# Patient Record
Sex: FEMALE | Race: WHITE | NOT HISPANIC OR LATINO | URBAN - METROPOLITAN AREA
[De-identification: names, ages, dates, MRNs, and addresses within clinical notes are randomized per-mention and may not be internally consistent; named-entity substitution may affect disease eponyms.]

---

## 2018-05-05 ENCOUNTER — APPOINTMENT (OUTPATIENT)
Dept: RADIOLOGY | Facility: MEDICAL CENTER | Age: 18
DRG: 329 | End: 2018-05-05
Payer: COMMERCIAL

## 2018-05-05 ENCOUNTER — HOSPITAL ENCOUNTER (INPATIENT)
Facility: MEDICAL CENTER | Age: 18
LOS: 11 days | DRG: 329 | End: 2018-05-16
Attending: EMERGENCY MEDICINE | Admitting: SURGERY
Payer: COMMERCIAL

## 2018-05-05 ENCOUNTER — RESOLUTE PROFESSIONAL BILLING HOSPITAL PROF FEE (OUTPATIENT)
Dept: HOSPITALIST | Facility: MEDICAL CENTER | Age: 18
End: 2018-05-05
Payer: COMMERCIAL

## 2018-05-05 DIAGNOSIS — S36.500A: ICD-10-CM

## 2018-05-05 DIAGNOSIS — S36.039A LACERATION OF SPLEEN, INITIAL ENCOUNTER: ICD-10-CM

## 2018-05-05 DIAGNOSIS — D62 ANEMIA DUE TO ACUTE BLOOD LOSS: ICD-10-CM

## 2018-05-05 DIAGNOSIS — S36.439A: ICD-10-CM

## 2018-05-05 DIAGNOSIS — J95.821 POSTOPERATIVE RESPIRATORY FAILURE (HCC): ICD-10-CM

## 2018-05-05 PROBLEM — Z53.09 CONTRAINDICATION TO DEEP VEIN THROMBOSIS (DVT) PROPHYLAXIS: Status: ACTIVE | Noted: 2018-05-05

## 2018-05-05 PROBLEM — T14.90XA TRAUMA: Status: ACTIVE | Noted: 2018-05-05

## 2018-05-05 LAB
ABO GROUP BLD: NORMAL
ABO GROUP BLD: NORMAL
ACTION RANGE TRIGGERED IACRT: NO
ALBUMIN SERPL BCP-MCNC: 3.9 G/DL (ref 3.2–4.9)
ALBUMIN/GLOB SERPL: 1.2 G/DL
ALP SERPL-CCNC: 71 U/L (ref 30–99)
ALT SERPL-CCNC: 38 U/L (ref 2–50)
ANION GAP SERPL CALC-SCNC: 10 MMOL/L (ref 0–11.9)
APTT PPP: 25.8 SEC (ref 24.7–36)
AST SERPL-CCNC: 60 U/L (ref 12–45)
BASE EXCESS BLDA CALC-SCNC: -5 MMOL/L (ref -4–3)
BILIRUB SERPL-MCNC: 0.3 MG/DL (ref 0.1–1.5)
BLD GP AB SCN SERPL QL: NORMAL
BODY TEMPERATURE: ABNORMAL DEGREES
BUN SERPL-MCNC: 13 MG/DL (ref 8–22)
CALCIUM SERPL-MCNC: 9.2 MG/DL (ref 8.5–10.5)
CHLORIDE SERPL-SCNC: 107 MMOL/L (ref 96–112)
CO2 BLDA-SCNC: 22 MMOL/L (ref 20–33)
CO2 SERPL-SCNC: 21 MMOL/L (ref 20–33)
CREAT SERPL-MCNC: 0.52 MG/DL (ref 0.5–1.4)
ERYTHROCYTE [DISTWIDTH] IN BLOOD BY AUTOMATED COUNT: 37.7 FL (ref 35.9–50)
ETHANOL BLD-MCNC: 0 G/DL
GLOBULIN SER CALC-MCNC: 3.2 G/DL (ref 1.9–3.5)
GLUCOSE SERPL-MCNC: 166 MG/DL (ref 65–99)
HCG SERPL QL: NEGATIVE
HCO3 BLDA-SCNC: 20.7 MMOL/L (ref 17–25)
HCT VFR BLD AUTO: 32.2 % (ref 37–47)
HGB BLD-MCNC: 7.9 G/DL (ref 12–16)
HGB BLD-MCNC: 9.9 G/DL (ref 12–16)
INR PPP: 0.97 (ref 0.87–1.13)
INST. QUALIFIED PATIENT IIQPT: YES
MCH RBC QN AUTO: 22.1 PG (ref 27–33)
MCHC RBC AUTO-ENTMCNC: 30.7 G/DL (ref 33.6–35)
MCV RBC AUTO: 72 FL (ref 81.4–97.8)
O2/TOTAL GAS SETTING VFR VENT: 40 %
PCO2 BLDA: 39.8 MMHG (ref 26–37)
PCO2 TEMP ADJ BLDA: 37.6 MMHG (ref 26–37)
PH BLDA: 7.33 [PH] (ref 7.4–7.5)
PH TEMP ADJ BLDA: 7.34 [PH] (ref 7.4–7.5)
PLATELET # BLD AUTO: 515 K/UL (ref 164–446)
PMV BLD AUTO: 10.1 FL (ref 9–12.9)
PO2 BLDA: 168 MMHG (ref 64–87)
PO2 TEMP ADJ BLDA: 161 MMHG (ref 64–87)
POTASSIUM SERPL-SCNC: 3.4 MMOL/L (ref 3.6–5.5)
PROT SERPL-MCNC: 7.1 G/DL (ref 6–8.2)
PROTHROMBIN TIME: 12.6 SEC (ref 12–14.6)
RBC # BLD AUTO: 4.47 M/UL (ref 4.2–5.4)
RH BLD: NORMAL
RH BLD: NORMAL
SAO2 % BLDA: 99 % (ref 93–99)
SODIUM SERPL-SCNC: 138 MMOL/L (ref 135–145)
SPECIMEN DRAWN FROM PATIENT: ABNORMAL
WBC # BLD AUTO: 26.1 K/UL (ref 4.8–10.8)

## 2018-05-05 PROCEDURE — 160009 HCHG ANES TIME/MIN: Performed by: SURGERY

## 2018-05-05 PROCEDURE — 88307 TISSUE EXAM BY PATHOLOGIST: CPT

## 2018-05-05 PROCEDURE — 110454 HCHG SHELL REV 250: Performed by: SURGERY

## 2018-05-05 PROCEDURE — 700111 HCHG RX REV CODE 636 W/ 250 OVERRIDE (IP)

## 2018-05-05 PROCEDURE — 85610 PROTHROMBIN TIME: CPT

## 2018-05-05 PROCEDURE — 72125 CT NECK SPINE W/O DYE: CPT

## 2018-05-05 PROCEDURE — 770022 HCHG ROOM/CARE - ICU (200)

## 2018-05-05 PROCEDURE — 94002 VENT MGMT INPAT INIT DAY: CPT

## 2018-05-05 PROCEDURE — 72170 X-RAY EXAM OF PELVIS: CPT

## 2018-05-05 PROCEDURE — 85730 THROMBOPLASTIN TIME PARTIAL: CPT

## 2018-05-05 PROCEDURE — 160048 HCHG OR STATISTICAL LEVEL 1-5: Performed by: SURGERY

## 2018-05-05 PROCEDURE — 160041 HCHG SURGERY MINUTES - EA ADDL 1 MIN LEVEL 4: Performed by: SURGERY

## 2018-05-05 PROCEDURE — 73070 X-RAY EXAM OF ELBOW: CPT | Mod: LT

## 2018-05-05 PROCEDURE — 71045 X-RAY EXAM CHEST 1 VIEW: CPT

## 2018-05-05 PROCEDURE — 700117 HCHG RX CONTRAST REV CODE 255: Performed by: EMERGENCY MEDICINE

## 2018-05-05 PROCEDURE — 85027 COMPLETE CBC AUTOMATED: CPT

## 2018-05-05 PROCEDURE — 500122 HCHG BOVIE, BLADE: Performed by: SURGERY

## 2018-05-05 PROCEDURE — 700105 HCHG RX REV CODE 258: Performed by: EMERGENCY MEDICINE

## 2018-05-05 PROCEDURE — 37799 UNLISTED PX VASCULAR SURGERY: CPT

## 2018-05-05 PROCEDURE — 70450 CT HEAD/BRAIN W/O DYE: CPT

## 2018-05-05 PROCEDURE — 700101 HCHG RX REV CODE 250

## 2018-05-05 PROCEDURE — 700105 HCHG RX REV CODE 258: Performed by: SURGERY

## 2018-05-05 PROCEDURE — 502240 HCHG MISC OR SUPPLY RC 0272: Performed by: SURGERY

## 2018-05-05 PROCEDURE — 71260 CT THORAX DX C+: CPT

## 2018-05-05 PROCEDURE — 86850 RBC ANTIBODY SCREEN: CPT

## 2018-05-05 PROCEDURE — 73560 X-RAY EXAM OF KNEE 1 OR 2: CPT | Mod: RT

## 2018-05-05 PROCEDURE — 700111 HCHG RX REV CODE 636 W/ 250 OVERRIDE (IP): Performed by: NURSE PRACTITIONER

## 2018-05-05 PROCEDURE — 80053 COMPREHEN METABOLIC PANEL: CPT

## 2018-05-05 PROCEDURE — 72131 CT LUMBAR SPINE W/O DYE: CPT

## 2018-05-05 PROCEDURE — 160029 HCHG SURGERY MINUTES - 1ST 30 MINS LEVEL 4: Performed by: SURGERY

## 2018-05-05 PROCEDURE — 82947 ASSAY GLUCOSE BLOOD QUANT: CPT

## 2018-05-05 PROCEDURE — 80307 DRUG TEST PRSMV CHEM ANLYZR: CPT

## 2018-05-05 PROCEDURE — G0390 TRAUMA RESPONS W/HOSP CRITI: HCPCS

## 2018-05-05 PROCEDURE — 85014 HEMATOCRIT: CPT

## 2018-05-05 PROCEDURE — 82803 BLOOD GASES ANY COMBINATION: CPT

## 2018-05-05 PROCEDURE — 700101 HCHG RX REV CODE 250: Performed by: NURSE PRACTITIONER

## 2018-05-05 PROCEDURE — 84703 CHORIONIC GONADOTROPIN ASSAY: CPT

## 2018-05-05 PROCEDURE — 86901 BLOOD TYPING SEROLOGIC RH(D): CPT

## 2018-05-05 PROCEDURE — 84295 ASSAY OF SERUM SODIUM: CPT

## 2018-05-05 PROCEDURE — 501838 HCHG SUTURE GENERAL: Performed by: SURGERY

## 2018-05-05 PROCEDURE — 501445 HCHG STAPLER, SKIN DISP: Performed by: SURGERY

## 2018-05-05 PROCEDURE — 72128 CT CHEST SPINE W/O DYE: CPT

## 2018-05-05 PROCEDURE — 501461: Performed by: SURGERY

## 2018-05-05 PROCEDURE — 85018 HEMOGLOBIN: CPT

## 2018-05-05 PROCEDURE — 84132 ASSAY OF SERUM POTASSIUM: CPT

## 2018-05-05 PROCEDURE — 86900 BLOOD TYPING SEROLOGIC ABO: CPT

## 2018-05-05 PROCEDURE — 99291 CRITICAL CARE FIRST HOUR: CPT

## 2018-05-05 PROCEDURE — 82330 ASSAY OF CALCIUM: CPT

## 2018-05-05 PROCEDURE — 70498 CT ANGIOGRAPHY NECK: CPT

## 2018-05-05 RX ORDER — SODIUM CHLORIDE 9 MG/ML
INJECTION, SOLUTION INTRAVENOUS
Status: COMPLETED | OUTPATIENT
Start: 2018-05-05 | End: 2018-05-05

## 2018-05-05 RX ORDER — ENEMA 19; 7 G/133ML; G/133ML
1 ENEMA RECTAL
Status: DISCONTINUED | OUTPATIENT
Start: 2018-05-05 | End: 2018-05-16 | Stop reason: HOSPADM

## 2018-05-05 RX ORDER — ACETAMINOPHEN 650 MG/1
650 SUPPOSITORY RECTAL EVERY 4 HOURS PRN
Status: DISCONTINUED | OUTPATIENT
Start: 2018-05-05 | End: 2018-05-16 | Stop reason: HOSPADM

## 2018-05-05 RX ORDER — OXYCODONE HYDROCHLORIDE 5 MG/1
5 TABLET ORAL
Status: DISCONTINUED | OUTPATIENT
Start: 2018-05-05 | End: 2018-05-16 | Stop reason: HOSPADM

## 2018-05-05 RX ORDER — SODIUM CHLORIDE 9 MG/ML
INJECTION, SOLUTION INTRAVENOUS CONTINUOUS
Status: DISCONTINUED | OUTPATIENT
Start: 2018-05-05 | End: 2018-05-09

## 2018-05-05 RX ORDER — AMOXICILLIN 250 MG
1 CAPSULE ORAL NIGHTLY
Status: DISCONTINUED | OUTPATIENT
Start: 2018-05-05 | End: 2018-05-16 | Stop reason: HOSPADM

## 2018-05-05 RX ORDER — AMOXICILLIN 250 MG
1 CAPSULE ORAL
Status: DISCONTINUED | OUTPATIENT
Start: 2018-05-05 | End: 2018-05-16 | Stop reason: HOSPADM

## 2018-05-05 RX ORDER — MAGNESIUM HYDROXIDE 1200 MG/15ML
LIQUID ORAL
Status: COMPLETED | OUTPATIENT
Start: 2018-05-05 | End: 2018-05-05

## 2018-05-05 RX ORDER — POLYETHYLENE GLYCOL 3350 17 G/17G
1 POWDER, FOR SOLUTION ORAL 2 TIMES DAILY
Status: DISCONTINUED | OUTPATIENT
Start: 2018-05-05 | End: 2018-05-16 | Stop reason: HOSPADM

## 2018-05-05 RX ORDER — SILICONES/ADHESIVE TAPE
COMBINATION PACKAGE (EA) TOPICAL 3 TIMES DAILY
Status: DISCONTINUED | OUTPATIENT
Start: 2018-05-05 | End: 2018-05-16 | Stop reason: HOSPADM

## 2018-05-05 RX ORDER — OXYCODONE HYDROCHLORIDE 10 MG/1
10 TABLET ORAL
Status: DISCONTINUED | OUTPATIENT
Start: 2018-05-05 | End: 2018-05-16 | Stop reason: HOSPADM

## 2018-05-05 RX ORDER — FAMOTIDINE 20 MG/1
20 TABLET, FILM COATED ORAL 2 TIMES DAILY
Status: DISCONTINUED | OUTPATIENT
Start: 2018-05-05 | End: 2018-05-08

## 2018-05-05 RX ORDER — ACETAMINOPHEN 325 MG/1
650 TABLET ORAL EVERY 4 HOURS PRN
Status: DISCONTINUED | OUTPATIENT
Start: 2018-05-05 | End: 2018-05-16 | Stop reason: HOSPADM

## 2018-05-05 RX ORDER — MORPHINE SULFATE 10 MG/ML
1-4 INJECTION, SOLUTION INTRAMUSCULAR; INTRAVENOUS
Status: DISCONTINUED | OUTPATIENT
Start: 2018-05-05 | End: 2018-05-06

## 2018-05-05 RX ORDER — BISACODYL 10 MG
10 SUPPOSITORY, RECTAL RECTAL
Status: DISCONTINUED | OUTPATIENT
Start: 2018-05-05 | End: 2018-05-16 | Stop reason: HOSPADM

## 2018-05-05 RX ORDER — DOCUSATE SODIUM 100 MG/1
100 CAPSULE, LIQUID FILLED ORAL 2 TIMES DAILY
Status: DISCONTINUED | OUTPATIENT
Start: 2018-05-05 | End: 2018-05-16 | Stop reason: HOSPADM

## 2018-05-05 RX ADMIN — IOHEXOL 80 ML: 350 INJECTION, SOLUTION INTRAVENOUS at 18:45

## 2018-05-05 RX ADMIN — SODIUM CHLORIDE: 9 INJECTION, SOLUTION INTRAVENOUS at 23:00

## 2018-05-05 RX ADMIN — IOHEXOL 60 ML: 350 INJECTION, SOLUTION INTRAVENOUS at 18:48

## 2018-05-05 RX ADMIN — PROPOFOL 10 MCG/KG/MIN: 10 INJECTION, EMULSION INTRAVENOUS at 21:16

## 2018-05-05 RX ADMIN — FAMOTIDINE 20 MG: 10 INJECTION INTRAVENOUS at 21:16

## 2018-05-05 RX ADMIN — MORPHINE SULFATE 10 MG: 10 INJECTION INTRAVENOUS at 21:46

## 2018-05-05 RX ADMIN — SODIUM CHLORIDE 1000 ML: 9 INJECTION, SOLUTION INTRAVENOUS at 18:13

## 2018-05-06 ENCOUNTER — APPOINTMENT (OUTPATIENT)
Dept: RADIOLOGY | Facility: MEDICAL CENTER | Age: 18
DRG: 329 | End: 2018-05-06
Attending: NURSE PRACTITIONER
Payer: COMMERCIAL

## 2018-05-06 PROBLEM — S36.500A: Status: ACTIVE | Noted: 2018-05-06

## 2018-05-06 PROBLEM — S36.439A: Status: ACTIVE | Noted: 2018-05-06

## 2018-05-06 PROBLEM — J95.821 POSTOPERATIVE RESPIRATORY FAILURE (HCC): Status: ACTIVE | Noted: 2018-05-06

## 2018-05-06 LAB
ALBUMIN SERPL BCP-MCNC: 3 G/DL (ref 3.2–4.9)
ALBUMIN/GLOB SERPL: 1.3 G/DL
ALP SERPL-CCNC: 48 U/L (ref 30–99)
ALT SERPL-CCNC: 30 U/L (ref 2–50)
ANION GAP SERPL CALC-SCNC: 8 MMOL/L (ref 0–11.9)
AST SERPL-CCNC: 35 U/L (ref 12–45)
BARCODED ABORH UBTYP: 600
BARCODED PRD CODE UBPRD: NORMAL
BARCODED UNIT NUM UBUNT: NORMAL
BASOPHILS # BLD AUTO: 0.1 % (ref 0–1.8)
BASOPHILS # BLD: 0.01 K/UL (ref 0–0.12)
BILIRUB SERPL-MCNC: 0.3 MG/DL (ref 0.1–1.5)
BUN SERPL-MCNC: 11 MG/DL (ref 8–22)
CALCIUM SERPL-MCNC: 7.9 MG/DL (ref 8.5–10.5)
CHLORIDE SERPL-SCNC: 109 MMOL/L (ref 96–112)
CO2 SERPL-SCNC: 20 MMOL/L (ref 20–33)
COMPONENT R 8504R: NORMAL
CREAT SERPL-MCNC: 0.46 MG/DL (ref 0.5–1.4)
EOSINOPHIL # BLD AUTO: 0 K/UL (ref 0–0.51)
EOSINOPHIL NFR BLD: 0 % (ref 0–6.9)
ERYTHROCYTE [DISTWIDTH] IN BLOOD BY AUTOMATED COUNT: 38.6 FL (ref 35.9–50)
GLOBULIN SER CALC-MCNC: 2.4 G/DL (ref 1.9–3.5)
GLUCOSE SERPL-MCNC: 155 MG/DL (ref 65–99)
HCT VFR BLD AUTO: 25.1 % (ref 37–47)
HGB BLD-MCNC: 6.6 G/DL (ref 12–16)
HGB BLD-MCNC: 7.2 G/DL (ref 12–16)
HGB BLD-MCNC: 7.8 G/DL (ref 12–16)
HGB BLD-MCNC: 8 G/DL (ref 12–16)
IMM GRANULOCYTES # BLD AUTO: 0.08 K/UL (ref 0–0.11)
IMM GRANULOCYTES NFR BLD AUTO: 0.5 % (ref 0–0.9)
LYMPHOCYTES # BLD AUTO: 0.68 K/UL (ref 1–4.8)
LYMPHOCYTES NFR BLD: 4.5 % (ref 22–41)
MCH RBC QN AUTO: 22.3 PG (ref 27–33)
MCHC RBC AUTO-ENTMCNC: 31.1 G/DL (ref 33.6–35)
MCV RBC AUTO: 71.9 FL (ref 81.4–97.8)
MONOCYTES # BLD AUTO: 1.01 K/UL (ref 0–0.85)
MONOCYTES NFR BLD AUTO: 6.7 % (ref 0–13.4)
NEUTROPHILS # BLD AUTO: 13.31 K/UL (ref 2–7.15)
NEUTROPHILS NFR BLD: 88.2 % (ref 44–72)
NRBC # BLD AUTO: 0 K/UL
NRBC BLD-RTO: 0 /100 WBC
PLATELET # BLD AUTO: 350 K/UL (ref 164–446)
PMV BLD AUTO: 10.3 FL (ref 9–12.9)
POTASSIUM SERPL-SCNC: 4 MMOL/L (ref 3.6–5.5)
PRODUCT TYPE UPROD: NORMAL
PROT SERPL-MCNC: 5.4 G/DL (ref 6–8.2)
RBC # BLD AUTO: 3.49 M/UL (ref 4.2–5.4)
SODIUM SERPL-SCNC: 137 MMOL/L (ref 135–145)
UNIT STATUS USTAT: NORMAL
WBC # BLD AUTO: 15.1 K/UL (ref 4.8–10.8)

## 2018-05-06 PROCEDURE — 30243N1 TRANSFUSION OF NONAUTOLOGOUS RED BLOOD CELLS INTO CENTRAL VEIN, PERCUTANEOUS APPROACH: ICD-10-PCS | Performed by: SURGERY

## 2018-05-06 PROCEDURE — 85018 HEMOGLOBIN: CPT | Mod: 91

## 2018-05-06 PROCEDURE — 94003 VENT MGMT INPAT SUBQ DAY: CPT

## 2018-05-06 PROCEDURE — 07QP0ZZ REPAIR SPLEEN, OPEN APPROACH: ICD-10-PCS | Performed by: SURGERY

## 2018-05-06 PROCEDURE — 36430 TRANSFUSION BLD/BLD COMPNT: CPT

## 2018-05-06 PROCEDURE — 86923 COMPATIBILITY TEST ELECTRIC: CPT

## 2018-05-06 PROCEDURE — 700105 HCHG RX REV CODE 258: Performed by: SURGERY

## 2018-05-06 PROCEDURE — 700101 HCHG RX REV CODE 250: Performed by: NURSE PRACTITIONER

## 2018-05-06 PROCEDURE — 0DT80ZZ RESECTION OF SMALL INTESTINE, OPEN APPROACH: ICD-10-PCS | Performed by: SURGERY

## 2018-05-06 PROCEDURE — 85025 COMPLETE CBC W/AUTO DIFF WBC: CPT

## 2018-05-06 PROCEDURE — 770022 HCHG ROOM/CARE - ICU (200)

## 2018-05-06 PROCEDURE — 0DQV0ZZ REPAIR MESENTERY, OPEN APPROACH: ICD-10-PCS | Performed by: SURGERY

## 2018-05-06 PROCEDURE — 700101 HCHG RX REV CODE 250

## 2018-05-06 PROCEDURE — 0DQH0ZZ REPAIR CECUM, OPEN APPROACH: ICD-10-PCS | Performed by: SURGERY

## 2018-05-06 PROCEDURE — 0JB80ZZ EXCISION OF ABDOMEN SUBCUTANEOUS TISSUE AND FASCIA, OPEN APPROACH: ICD-10-PCS | Performed by: SURGERY

## 2018-05-06 PROCEDURE — 94150 VITAL CAPACITY TEST: CPT

## 2018-05-06 PROCEDURE — 94669 MECHANICAL CHEST WALL OSCILL: CPT

## 2018-05-06 PROCEDURE — 80053 COMPREHEN METABOLIC PANEL: CPT

## 2018-05-06 PROCEDURE — 94668 MNPJ CHEST WALL SBSQ: CPT

## 2018-05-06 PROCEDURE — P9016 RBC LEUKOCYTES REDUCED: HCPCS

## 2018-05-06 PROCEDURE — 700111 HCHG RX REV CODE 636 W/ 250 OVERRIDE (IP): Performed by: SURGERY

## 2018-05-06 PROCEDURE — 99291 CRITICAL CARE FIRST HOUR: CPT | Performed by: SURGERY

## 2018-05-06 PROCEDURE — 71045 X-RAY EXAM CHEST 1 VIEW: CPT

## 2018-05-06 PROCEDURE — 700111 HCHG RX REV CODE 636 W/ 250 OVERRIDE (IP): Performed by: NURSE PRACTITIONER

## 2018-05-06 PROCEDURE — 700102 HCHG RX REV CODE 250 W/ 637 OVERRIDE(OP)

## 2018-05-06 RX ORDER — MORPHINE SULFATE 4 MG/ML
1-4 INJECTION, SOLUTION INTRAMUSCULAR; INTRAVENOUS
Status: DISCONTINUED | OUTPATIENT
Start: 2018-05-06 | End: 2018-05-08

## 2018-05-06 RX ADMIN — PROPOFOL 40 MCG/KG/MIN: 10 INJECTION, EMULSION INTRAVENOUS at 05:03

## 2018-05-06 RX ADMIN — MORPHINE SULFATE 4 MG: 4 INJECTION INTRAVENOUS at 19:41

## 2018-05-06 RX ADMIN — MORPHINE SULFATE 4 MG: 4 INJECTION INTRAVENOUS at 22:03

## 2018-05-06 RX ADMIN — FAMOTIDINE 20 MG: 10 INJECTION INTRAVENOUS at 07:31

## 2018-05-06 RX ADMIN — MORPHINE SULFATE 4 MG: 4 INJECTION INTRAVENOUS at 13:49

## 2018-05-06 RX ADMIN — FAMOTIDINE 20 MG: 10 INJECTION INTRAVENOUS at 21:42

## 2018-05-06 RX ADMIN — MORPHINE SULFATE 4 MG: 10 INJECTION INTRAVENOUS at 11:04

## 2018-05-06 RX ADMIN — BACITRACIN ZINC, AND POLYMYXIN B SULFATE: 500; 10000 OINTMENT TOPICAL at 07:39

## 2018-05-06 RX ADMIN — BACITRACIN ZINC, AND POLYMYXIN B SULFATE: 500; 10000 OINTMENT TOPICAL at 13:50

## 2018-05-06 RX ADMIN — BACITRACIN ZINC, AND POLYMYXIN B SULFATE: 500; 10000 OINTMENT TOPICAL at 21:42

## 2018-05-06 RX ADMIN — SODIUM CHLORIDE: 9 INJECTION, SOLUTION INTRAVENOUS at 21:45

## 2018-05-06 RX ADMIN — MORPHINE SULFATE 4 MG: 10 INJECTION INTRAVENOUS at 07:31

## 2018-05-06 RX ADMIN — SODIUM CHLORIDE: 9 INJECTION, SOLUTION INTRAVENOUS at 07:31

## 2018-05-06 RX ADMIN — MORPHINE SULFATE 4 MG: 4 INJECTION INTRAVENOUS at 17:53

## 2018-05-06 RX ADMIN — SODIUM CHLORIDE: 9 INJECTION, SOLUTION INTRAVENOUS at 16:34

## 2018-05-06 ASSESSMENT — PAIN SCALES - GENERAL
PAINLEVEL_OUTOF10: 7
PAINLEVEL_OUTOF10: 3
PAINLEVEL_OUTOF10: 7
PAINLEVEL_OUTOF10: 6
PAINLEVEL_OUTOF10: 8
PAINLEVEL_OUTOF10: 3

## 2018-05-06 ASSESSMENT — PULMONARY FUNCTION TESTS: FVC: .89

## 2018-05-06 ASSESSMENT — COPD QUESTIONNAIRES
HAVE YOU SMOKED AT LEAST 100 CIGARETTES IN YOUR ENTIRE LIFE: NO/DON'T KNOW
DURING THE PAST 4 WEEKS HOW MUCH DID YOU FEEL SHORT OF BREATH: NONE/LITTLE OF THE TIME
DO YOU EVER COUGH UP ANY MUCUS OR PHLEGM?: NO/ONLY WITH OCCASIONAL COLDS OR INFECTIONS
COPD SCREENING SCORE: 0

## 2018-05-06 ASSESSMENT — LIFESTYLE VARIABLES
EVER_SMOKED: NEVER
EVER_SMOKED: NEVER

## 2018-05-06 NOTE — RESPIRATORY CARE
Extubation    Cuff leak noted yes  Stridor present no     FiO2%: 30 % (05/06/18 0800)  O2 (LPM): 4 (05/06/18 1007)  O2 Daily Delivery Respiratory : Silicone Nasal Cannula (05/06/18 1007)  Patient tolerated well  RCP Complete? Will reassess when able to speak  Events/Summary/Plan: pt. extubated to 4LNC, discussed during rounds with MD. (05/06/18 1007)

## 2018-05-06 NOTE — CARE PLAN
Problem: Ventilation Defect:  Goal: Ability to achieve and maintain unassisted ventilation or tolerate decreased levels of ventilator support    Intervention: Support and monitor invasive and noninvasive mechanical ventilation  Adult Ventilation Update    Total Vent Days: 2    Patient Lines/Drains/Airways Status    Active Airway     Name: Placement date: Placement time: Site: Days:    Airway Group ET Tube Oral 6.5 05/05/18      Oral   2              Sputum Amount: Scant (05/06/18 0400)  Sputum Color: Clear (05/06/18 0400)  Sputum Consistency: Thin (05/06/18 0400)    Mobility  Level of Mobility: Level II (05/05/18 2200)  Activity Performed: Unable to mobilize (05/05/18 2200)  Reason Not Mobilized: Unstable condition (05/05/18 2200)    Events/Summary/Plan: fio2 decreased to 30% post abg (05/05/18 2236)
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
## 2018-05-06 NOTE — CARE PLAN
Problem: Bowel/Gastric:  Goal: Will not experience complications related to bowel motility  Outcome: PROGRESSING AS EXPECTED  Patient strict NPO, no bowel movement since admission    Problem: Pain Management  Goal: Pain level will decrease to patient's comfort goal  Outcome: PROGRESSING AS EXPECTED  Appropriate PRN medications on MAR   05/05/18 2200 05/06/18 0222   OTHER   CPOT Total Score 7 --    Comfort Goal --  Comfort at Rest;Comfort with Movement
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
## 2018-05-06 NOTE — PROGRESS NOTES
RN to round with Dr. Renteria. MD orders that pt is to remain on bedrest, MD orders to keep the arterial line in place, MD orders that pts morphine may be changed to q 1 hours.
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
## 2018-05-06 NOTE — CARE PLAN
Problem: Venous Thromboembolism (VTW)/Deep Vein Thrombosis (DVT) Prevention:  Goal: Patient will participate in Venous Thrombosis (VTE)/Deep Vein Thrombosis (DVT)Prevention Measures    Intervention: Ensure patient wears graduated elastic stockings (SANDIP hose) and/or SCDs, if ordered, when in bed or chair (Remove at least once per shift for skin check)  SCDs are in place, SCD machine is on, SCDs are in place.       Problem: Pain Management  Goal: Pain level will decrease to patient's comfort goal    Intervention: Follow pain managment plan developed in collaboration with patient and Interdisciplinary Team  RN to assess pts pain at regular intervals, RN to provide pain medication prn. RN will continue to assess pts pain needs.
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
## 2018-05-07 LAB
ACTION RANGE TRIGGERED IACRT: NO
ANION GAP SERPL CALC-SCNC: 9 MMOL/L (ref 0–11.9)
BASE EXCESS BLDA CALC-SCNC: -3 MMOL/L (ref -4–3)
BASOPHILS # BLD AUTO: 0.4 % (ref 0–1.8)
BASOPHILS # BLD: 0.06 K/UL (ref 0–0.12)
BODY TEMPERATURE: ABNORMAL DEGREES
BUN SERPL-MCNC: 6 MG/DL (ref 8–22)
CA-I BLD ISE-SCNC: 1.14 MMOL/L (ref 1.1–1.3)
CALCIUM SERPL-MCNC: 7.9 MG/DL (ref 8.5–10.5)
CHLORIDE SERPL-SCNC: 108 MMOL/L (ref 96–112)
CO2 BLDA-SCNC: 23 MMOL/L (ref 20–33)
CO2 SERPL-SCNC: 21 MMOL/L (ref 20–33)
COMMENT 1642: NORMAL
CREAT SERPL-MCNC: 0.38 MG/DL (ref 0.5–1.4)
EOSINOPHIL # BLD AUTO: 0 K/UL (ref 0–0.51)
EOSINOPHIL NFR BLD: 0 % (ref 0–6.9)
ERYTHROCYTE [DISTWIDTH] IN BLOOD BY AUTOMATED COUNT: 39.7 FL (ref 35.9–50)
GLUCOSE BLD-MCNC: 120 MG/DL (ref 65–99)
GLUCOSE SERPL-MCNC: 112 MG/DL (ref 65–99)
HCO3 BLDA-SCNC: 22.2 MMOL/L (ref 17–25)
HCT VFR BLD AUTO: 25.4 % (ref 37–47)
HCT VFR BLD CALC: 24 % (ref 37–47)
HGB BLD-MCNC: 7.8 G/DL (ref 12–16)
HGB BLD-MCNC: 7.9 G/DL (ref 12–16)
HGB BLD-MCNC: 8.2 G/DL (ref 12–16)
HGB BLD-MCNC: 8.2 G/DL (ref 12–16)
HGB BLD-MCNC: 8.3 G/DL (ref 12–16)
IMM GRANULOCYTES # BLD AUTO: 0.06 K/UL (ref 0–0.11)
IMM GRANULOCYTES NFR BLD AUTO: 0.4 % (ref 0–0.9)
INST. QUALIFIED PATIENT IIQPT: YES
LYMPHOCYTES # BLD AUTO: 1.27 K/UL (ref 1–4.8)
LYMPHOCYTES NFR BLD: 8.7 % (ref 22–41)
MAGNESIUM SERPL-MCNC: 1.7 MG/DL (ref 1.5–2.5)
MCH RBC QN AUTO: 22.8 PG (ref 27–33)
MCHC RBC AUTO-ENTMCNC: 31.1 G/DL (ref 33.6–35)
MCV RBC AUTO: 73.2 FL (ref 81.4–97.8)
MONOCYTES # BLD AUTO: 0.57 K/UL (ref 0–0.85)
MONOCYTES NFR BLD AUTO: 3.9 % (ref 0–13.4)
MORPHOLOGY BLD-IMP: NORMAL
NEUTROPHILS # BLD AUTO: 12.57 K/UL (ref 2–7.15)
NEUTROPHILS NFR BLD: 86.6 % (ref 44–72)
NRBC # BLD AUTO: 0 K/UL
NRBC BLD-RTO: 0 /100 WBC
O2/TOTAL GAS SETTING VFR VENT: 95 %
PCO2 BLDA: 41.4 MMHG (ref 26–37)
PCO2 TEMP ADJ BLDA: 41.4 MMHG (ref 26–37)
PH BLDA: 7.34 [PH] (ref 7.4–7.5)
PH TEMP ADJ BLDA: 7.34 [PH] (ref 7.4–7.5)
PHOSPHATE SERPL-MCNC: 3 MG/DL (ref 2.5–4.5)
PLATELET # BLD AUTO: 227 K/UL (ref 164–446)
PLATELET BLD QL SMEAR: NORMAL
PMV BLD AUTO: 10.3 FL (ref 9–12.9)
PO2 BLDA: 422 MMHG (ref 64–87)
PO2 TEMP ADJ BLDA: 422 MMHG (ref 64–87)
POTASSIUM BLD-SCNC: 3.4 MMOL/L (ref 3.6–5.5)
POTASSIUM SERPL-SCNC: 3.7 MMOL/L (ref 3.6–5.5)
RBC # BLD AUTO: 3.47 M/UL (ref 4.2–5.4)
RBC BLD AUTO: NORMAL
SAO2 % BLDA: 100 % (ref 93–99)
SODIUM BLD-SCNC: 139 MMOL/L (ref 135–145)
SODIUM SERPL-SCNC: 138 MMOL/L (ref 135–145)
SPECIMEN DRAWN FROM PATIENT: ABNORMAL
TRIGL SERPL-MCNC: 85 MG/DL (ref 0–149)
WBC # BLD AUTO: 14.5 K/UL (ref 4.8–10.8)

## 2018-05-07 PROCEDURE — 84478 ASSAY OF TRIGLYCERIDES: CPT

## 2018-05-07 PROCEDURE — 501838 HCHG SUTURE GENERAL: Performed by: SURGERY

## 2018-05-07 PROCEDURE — 500697 HCHG HEMOCLIP, LARGE (ORANGE): Performed by: SURGERY

## 2018-05-07 PROCEDURE — 160041 HCHG SURGERY MINUTES - EA ADDL 1 MIN LEVEL 4: Performed by: SURGERY

## 2018-05-07 PROCEDURE — 700111 HCHG RX REV CODE 636 W/ 250 OVERRIDE (IP)

## 2018-05-07 PROCEDURE — 700101 HCHG RX REV CODE 250

## 2018-05-07 PROCEDURE — 84100 ASSAY OF PHOSPHORUS: CPT

## 2018-05-07 PROCEDURE — 0WJP0ZZ INSPECTION OF GASTROINTESTINAL TRACT, OPEN APPROACH: ICD-10-PCS | Performed by: SURGERY

## 2018-05-07 PROCEDURE — 700102 HCHG RX REV CODE 250 W/ 637 OVERRIDE(OP)

## 2018-05-07 PROCEDURE — 500698 HCHG HEMOCLIP, MEDIUM: Performed by: SURGERY

## 2018-05-07 PROCEDURE — 160029 HCHG SURGERY MINUTES - 1ST 30 MINS LEVEL 4: Performed by: SURGERY

## 2018-05-07 PROCEDURE — 85025 COMPLETE CBC W/AUTO DIFF WBC: CPT

## 2018-05-07 PROCEDURE — 94668 MNPJ CHEST WALL SBSQ: CPT

## 2018-05-07 PROCEDURE — 99233 SBSQ HOSP IP/OBS HIGH 50: CPT | Performed by: SURGERY

## 2018-05-07 PROCEDURE — 83735 ASSAY OF MAGNESIUM: CPT

## 2018-05-07 PROCEDURE — 85018 HEMOGLOBIN: CPT | Mod: 91

## 2018-05-07 PROCEDURE — 160002 HCHG RECOVERY MINUTES (STAT): Performed by: SURGERY

## 2018-05-07 PROCEDURE — 770022 HCHG ROOM/CARE - ICU (200)

## 2018-05-07 PROCEDURE — A9270 NON-COVERED ITEM OR SERVICE: HCPCS

## 2018-05-07 PROCEDURE — 160048 HCHG OR STATISTICAL LEVEL 1-5: Performed by: SURGERY

## 2018-05-07 PROCEDURE — 160035 HCHG PACU - 1ST 60 MINS PHASE I: Performed by: SURGERY

## 2018-05-07 PROCEDURE — 700111 HCHG RX REV CODE 636 W/ 250 OVERRIDE (IP): Performed by: SURGERY

## 2018-05-07 PROCEDURE — 94669 MECHANICAL CHEST WALL OSCILL: CPT

## 2018-05-07 PROCEDURE — 700111 HCHG RX REV CODE 636 W/ 250 OVERRIDE (IP): Performed by: NURSE PRACTITIONER

## 2018-05-07 PROCEDURE — 501445 HCHG STAPLER, SKIN DISP: Performed by: SURGERY

## 2018-05-07 PROCEDURE — 160009 HCHG ANES TIME/MIN: Performed by: SURGERY

## 2018-05-07 PROCEDURE — 80048 BASIC METABOLIC PNL TOTAL CA: CPT

## 2018-05-07 RX ORDER — MEPERIDINE HYDROCHLORIDE 25 MG/ML
INJECTION INTRAMUSCULAR; INTRAVENOUS; SUBCUTANEOUS
Status: COMPLETED
Start: 2018-05-07 | End: 2018-05-07

## 2018-05-07 RX ORDER — MIDAZOLAM HYDROCHLORIDE 1 MG/ML
INJECTION INTRAMUSCULAR; INTRAVENOUS
Status: DISPENSED
Start: 2018-05-07 | End: 2018-05-07

## 2018-05-07 RX ADMIN — BACITRACIN ZINC, AND POLYMYXIN B SULFATE: 500; 10000 OINTMENT TOPICAL at 15:00

## 2018-05-07 RX ADMIN — MEPERIDINE HYDROCHLORIDE 12.5 MG: 25 INJECTION INTRAMUSCULAR; INTRAVENOUS; SUBCUTANEOUS at 10:40

## 2018-05-07 RX ADMIN — BACITRACIN ZINC, AND POLYMYXIN B SULFATE: 500; 10000 OINTMENT TOPICAL at 21:05

## 2018-05-07 RX ADMIN — MORPHINE SULFATE 4 MG: 4 INJECTION INTRAVENOUS at 21:06

## 2018-05-07 RX ADMIN — MORPHINE SULFATE 4 MG: 4 INJECTION INTRAVENOUS at 11:27

## 2018-05-07 RX ADMIN — MORPHINE SULFATE 4 MG: 4 INJECTION INTRAVENOUS at 15:38

## 2018-05-07 RX ADMIN — FAMOTIDINE 20 MG: 10 INJECTION INTRAVENOUS at 21:06

## 2018-05-07 RX ADMIN — MEPERIDINE HYDROCHLORIDE 12.5 MG: 25 INJECTION INTRAMUSCULAR; INTRAVENOUS; SUBCUTANEOUS at 10:35

## 2018-05-07 RX ADMIN — MORPHINE SULFATE 4 MG: 4 INJECTION INTRAVENOUS at 02:15

## 2018-05-07 ASSESSMENT — PATIENT HEALTH QUESTIONNAIRE - PHQ9
1. LITTLE INTEREST OR PLEASURE IN DOING THINGS: NOT AT ALL
2. FEELING DOWN, DEPRESSED, IRRITABLE, OR HOPELESS: NOT AT ALL
SUM OF ALL RESPONSES TO PHQ9 QUESTIONS 1 AND 2: 0

## 2018-05-07 ASSESSMENT — PAIN SCALES - GENERAL
PAINLEVEL_OUTOF10: 6
PAINLEVEL_OUTOF10: 8
PAINLEVEL_OUTOF10: 0
PAINLEVEL_OUTOF10: 7
PAINLEVEL_OUTOF10: 5
PAINLEVEL_OUTOF10: 4
PAINLEVEL_OUTOF10: 0
PAINLEVEL_OUTOF10: 7
PAINLEVEL_OUTOF10: 8
PAINLEVEL_OUTOF10: 7
PAINLEVEL_OUTOF10: 3
PAINLEVEL_OUTOF10: 8
PAINLEVEL_OUTOF10: 4

## 2018-05-07 ASSESSMENT — ENCOUNTER SYMPTOMS: ABDOMINAL PAIN: 1

## 2018-05-07 NOTE — PROGRESS NOTES
Pt. en route to pre-op holding with transport monitor, transport and RN. Pt. To have washout and closure to abdomen. Pre-op checklist complete.
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
## 2018-05-07 NOTE — CARE PLAN
Problem: Pain Management  Goal: Pain level will decrease to patient's comfort goal  Outcome: PROGRESSING AS EXPECTED  Pt communicates pain appropriately and requests medication when needed. Reports relieved pain after intervention.
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
## 2018-05-07 NOTE — PROGRESS NOTES
Surgery  Met with Ms Sanchez Heaton before surgery  At her request discussed also with family by phone with patient present    Discussed proposed procedure, planned return to OR exploratory laparotomy evaluation of repairs possible closure.     Discussed with them that may need additional repair bowel or even possible removal spleen    Discussed risk to include but not limited to bleeding requiring transfusion, bowel leak/failure of repair, injury bowel bladder blood vessels, or other intra-abdominal structures, bleeding spleen after surgery that may need treatment/surgery, wound complications, bowel obstruction ugly scar pain    All questions answered.   Informed consent obtained

## 2018-05-07 NOTE — THERAPY
PT eval order received. Per RN pt is on bedrest pending surgery today. Will defer PT eval until post op and appropriate.

## 2018-05-07 NOTE — PROGRESS NOTES
Pt. Returned to room S-120 post-op abdominal washout and closure. See EHR for vital signs. Pt. Drowsy, arousal, rating pain 8/10. PRN IV morphine administered with good results. Surgical site covered in a gauze surgical dressing. Midline shadowing present on the bottom part of dressing covering the soft tissue injury at the bottom of the incision. Will continue to monitor closely.
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
## 2018-05-07 NOTE — PROGRESS NOTES
RN to speak with Dr. Renteria regarding pts Hgb. MD orders to place a nursing communication to transfuse if pts Hgb is less then 7. MD orders to continue to check serial hgbs overnight. RN to place the orders.

## 2018-05-08 PROBLEM — D62 ANEMIA DUE TO ACUTE BLOOD LOSS: Status: ACTIVE | Noted: 2018-05-08

## 2018-05-08 LAB
ALBUMIN SERPL BCP-MCNC: 2.5 G/DL (ref 3.2–4.9)
ALBUMIN/GLOB SERPL: 1 G/DL
ALP SERPL-CCNC: 53 U/L (ref 30–99)
ALT SERPL-CCNC: 17 U/L (ref 2–50)
ANION GAP SERPL CALC-SCNC: 10 MMOL/L (ref 0–11.9)
AST SERPL-CCNC: 21 U/L (ref 12–45)
BASOPHILS # BLD AUTO: 0.3 % (ref 0–1.8)
BASOPHILS # BLD: 0.03 K/UL (ref 0–0.12)
BILIRUB SERPL-MCNC: 0.6 MG/DL (ref 0.1–1.5)
BUN SERPL-MCNC: 4 MG/DL (ref 8–22)
CALCIUM SERPL-MCNC: 7.9 MG/DL (ref 8.5–10.5)
CHLORIDE SERPL-SCNC: 104 MMOL/L (ref 96–112)
CO2 SERPL-SCNC: 22 MMOL/L (ref 20–33)
CREAT SERPL-MCNC: 0.34 MG/DL (ref 0.5–1.4)
EOSINOPHIL # BLD AUTO: 0.03 K/UL (ref 0–0.51)
EOSINOPHIL NFR BLD: 0.3 % (ref 0–6.9)
ERYTHROCYTE [DISTWIDTH] IN BLOOD BY AUTOMATED COUNT: 40 FL (ref 35.9–50)
GLOBULIN SER CALC-MCNC: 2.4 G/DL (ref 1.9–3.5)
GLUCOSE SERPL-MCNC: 89 MG/DL (ref 65–99)
HCT VFR BLD AUTO: 23.6 % (ref 37–47)
HGB BLD-MCNC: 7.5 G/DL (ref 12–16)
HGB BLD-MCNC: 7.5 G/DL (ref 12–16)
HGB BLD-MCNC: 7.8 G/DL (ref 12–16)
IMM GRANULOCYTES # BLD AUTO: 0.04 K/UL (ref 0–0.11)
IMM GRANULOCYTES NFR BLD AUTO: 0.4 % (ref 0–0.9)
LYMPHOCYTES # BLD AUTO: 1.09 K/UL (ref 1–4.8)
LYMPHOCYTES NFR BLD: 10 % (ref 22–41)
MCH RBC QN AUTO: 23.4 PG (ref 27–33)
MCHC RBC AUTO-ENTMCNC: 31.8 G/DL (ref 33.6–35)
MCV RBC AUTO: 73.5 FL (ref 81.4–97.8)
MONOCYTES # BLD AUTO: 0.83 K/UL (ref 0–0.85)
MONOCYTES NFR BLD AUTO: 7.6 % (ref 0–13.4)
NEUTROPHILS # BLD AUTO: 8.93 K/UL (ref 2–7.15)
NEUTROPHILS NFR BLD: 81.4 % (ref 44–72)
NRBC # BLD AUTO: 0 K/UL
NRBC BLD-RTO: 0 /100 WBC
PLATELET # BLD AUTO: 212 K/UL (ref 164–446)
PMV BLD AUTO: 10.4 FL (ref 9–12.9)
POTASSIUM SERPL-SCNC: 3.5 MMOL/L (ref 3.6–5.5)
PROT SERPL-MCNC: 4.9 G/DL (ref 6–8.2)
RBC # BLD AUTO: 3.21 M/UL (ref 4.2–5.4)
SODIUM SERPL-SCNC: 136 MMOL/L (ref 135–145)
WBC # BLD AUTO: 11 K/UL (ref 4.8–10.8)

## 2018-05-08 PROCEDURE — G8978 MOBILITY CURRENT STATUS: HCPCS | Mod: CL

## 2018-05-08 PROCEDURE — 85018 HEMOGLOBIN: CPT | Mod: 91

## 2018-05-08 PROCEDURE — 94669 MECHANICAL CHEST WALL OSCILL: CPT

## 2018-05-08 PROCEDURE — G8987 SELF CARE CURRENT STATUS: HCPCS | Mod: CL

## 2018-05-08 PROCEDURE — 700112 HCHG RX REV CODE 229: Performed by: NURSE PRACTITIONER

## 2018-05-08 PROCEDURE — 700111 HCHG RX REV CODE 636 W/ 250 OVERRIDE (IP): Performed by: SURGERY

## 2018-05-08 PROCEDURE — 700102 HCHG RX REV CODE 250 W/ 637 OVERRIDE(OP): Performed by: SURGERY

## 2018-05-08 PROCEDURE — G8988 SELF CARE GOAL STATUS: HCPCS | Mod: CI

## 2018-05-08 PROCEDURE — A9270 NON-COVERED ITEM OR SERVICE: HCPCS | Performed by: NURSE PRACTITIONER

## 2018-05-08 PROCEDURE — 97166 OT EVAL MOD COMPLEX 45 MIN: CPT

## 2018-05-08 PROCEDURE — 700105 HCHG RX REV CODE 258: Performed by: SURGERY

## 2018-05-08 PROCEDURE — 93971 EXTREMITY STUDY: CPT | Mod: 26 | Performed by: SURGERY

## 2018-05-08 PROCEDURE — 97162 PT EVAL MOD COMPLEX 30 MIN: CPT

## 2018-05-08 PROCEDURE — 700102 HCHG RX REV CODE 250 W/ 637 OVERRIDE(OP): Performed by: NURSE PRACTITIONER

## 2018-05-08 PROCEDURE — 85025 COMPLETE CBC W/AUTO DIFF WBC: CPT

## 2018-05-08 PROCEDURE — 770006 HCHG ROOM/CARE - MED/SURG/GYN SEMI*

## 2018-05-08 PROCEDURE — 94668 MNPJ CHEST WALL SBSQ: CPT

## 2018-05-08 PROCEDURE — 80053 COMPREHEN METABOLIC PANEL: CPT

## 2018-05-08 PROCEDURE — G8979 MOBILITY GOAL STATUS: HCPCS | Mod: CI

## 2018-05-08 PROCEDURE — 93971 EXTREMITY STUDY: CPT

## 2018-05-08 PROCEDURE — 99233 SBSQ HOSP IP/OBS HIGH 50: CPT | Performed by: SURGERY

## 2018-05-08 PROCEDURE — A9270 NON-COVERED ITEM OR SERVICE: HCPCS | Performed by: SURGERY

## 2018-05-08 RX ORDER — FAMOTIDINE 20 MG/1
TABLET, FILM COATED ORAL
Status: DISCONTINUED
Start: 2018-05-08 | End: 2018-05-08

## 2018-05-08 RX ORDER — ONDANSETRON 2 MG/ML
4 INJECTION INTRAMUSCULAR; INTRAVENOUS EVERY 4 HOURS PRN
Status: DISCONTINUED | OUTPATIENT
Start: 2018-05-08 | End: 2018-05-16 | Stop reason: HOSPADM

## 2018-05-08 RX ORDER — FAMOTIDINE 20 MG/1
20 TABLET, FILM COATED ORAL 2 TIMES DAILY
Status: DISCONTINUED | OUTPATIENT
Start: 2018-05-08 | End: 2018-05-09

## 2018-05-08 RX ORDER — MORPHINE SULFATE 4 MG/ML
1-4 INJECTION, SOLUTION INTRAMUSCULAR; INTRAVENOUS
Status: DISCONTINUED | OUTPATIENT
Start: 2018-05-08 | End: 2018-05-16 | Stop reason: HOSPADM

## 2018-05-08 RX ADMIN — MORPHINE SULFATE 4 MG: 4 INJECTION INTRAVENOUS at 06:09

## 2018-05-08 RX ADMIN — FAMOTIDINE 20 MG: 20 TABLET ORAL at 10:37

## 2018-05-08 RX ADMIN — STANDARDIZED SENNA CONCENTRATE AND DOCUSATE SODIUM 1 TABLET: 8.6; 5 TABLET, FILM COATED ORAL at 21:10

## 2018-05-08 RX ADMIN — OXYCODONE HYDROCHLORIDE 5 MG: 5 TABLET ORAL at 21:10

## 2018-05-08 RX ADMIN — DOCUSATE SODIUM 100 MG: 100 CAPSULE ORAL at 21:10

## 2018-05-08 RX ADMIN — POTASSIUM BICARBONATE 25 MEQ: 25 TABLET, EFFERVESCENT ORAL at 12:05

## 2018-05-08 RX ADMIN — MORPHINE SULFATE 4 MG: 4 INJECTION INTRAVENOUS at 00:28

## 2018-05-08 RX ADMIN — BACITRACIN ZINC, AND POLYMYXIN B SULFATE: 500; 10000 OINTMENT TOPICAL at 10:37

## 2018-05-08 RX ADMIN — BACITRACIN ZINC, AND POLYMYXIN B SULFATE: 500; 10000 OINTMENT TOPICAL at 15:00

## 2018-05-08 RX ADMIN — SODIUM CHLORIDE: 9 INJECTION, SOLUTION INTRAVENOUS at 00:16

## 2018-05-08 RX ADMIN — FAMOTIDINE 20 MG: 20 TABLET ORAL at 21:00

## 2018-05-08 ASSESSMENT — ACTIVITIES OF DAILY LIVING (ADL): TOILETING: INDEPENDENT

## 2018-05-08 ASSESSMENT — ENCOUNTER SYMPTOMS
COUGH: 0
BACK PAIN: 0
NAUSEA: 1
BLURRED VISION: 0
FEVER: 0
ABDOMINAL PAIN: 1
ROS GI COMMENTS: BM PTA
CONSTIPATION: 0
CARDIOVASCULAR NEGATIVE: 1
SHORTNESS OF BREATH: 0
WEAKNESS: 0
NEUROLOGICAL NEGATIVE: 1
VOMITING: 1
DOUBLE VISION: 0
PSYCHIATRIC NEGATIVE: 1
MYALGIAS: 0

## 2018-05-08 ASSESSMENT — COGNITIVE AND FUNCTIONAL STATUS - GENERAL
HELP NEEDED FOR BATHING: A LOT
DRESSING REGULAR UPPER BODY CLOTHING: A LOT
CLIMB 3 TO 5 STEPS WITH RAILING: A LOT
MOBILITY SCORE: 10
DRESSING REGULAR LOWER BODY CLOTHING: A LOT
PERSONAL GROOMING: A LITTLE
TURNING FROM BACK TO SIDE WHILE IN FLAT BAD: UNABLE
SUGGESTED CMS G CODE MODIFIER DAILY ACTIVITY: CK
WALKING IN HOSPITAL ROOM: A LOT
DAILY ACTIVITIY SCORE: 14
MOVING TO AND FROM BED TO CHAIR: UNABLE
MOVING FROM LYING ON BACK TO SITTING ON SIDE OF FLAT BED: UNABLE
SUGGESTED CMS G CODE MODIFIER MOBILITY: CL
STANDING UP FROM CHAIR USING ARMS: A LITTLE
TOILETING: A LOT
EATING MEALS: A LITTLE

## 2018-05-08 ASSESSMENT — PAIN SCALES - GENERAL
PAINLEVEL_OUTOF10: 4
PAINLEVEL_OUTOF10: 3
PAINLEVEL_OUTOF10: 0
PAINLEVEL_OUTOF10: 3
PAINLEVEL_OUTOF10: 6
PAINLEVEL_OUTOF10: 7
PAINLEVEL_OUTOF10: 0

## 2018-05-08 ASSESSMENT — GAIT ASSESSMENTS: GAIT LEVEL OF ASSIST: UNABLE TO PARTICIPATE

## 2018-05-08 NOTE — CARE PLAN
Problem: Knowledge Deficit  Goal: Knowledge of disease process/condition, treatment plan, diagnostic tests, and medications will improve  Outcome: PROGRESSING AS EXPECTED  Pt updated on POC and reasoning behind therapies and interventions. Pt understands and encouraged to ask questions      Problem: Pain Management  Goal: Pain level will decrease to patient's comfort goal  Outcome: PROGRESSING AS EXPECTED  Pt pain assessed using appropriate pain scale, 1-10 and treated per MAR. Non-pharm interventions in place such as re-positioning and temperature therapy and mobilization

## 2018-05-08 NOTE — WOUND TEAM
Order received to place VAC drsg to to distal abd incision. Discussion with RN that wound has packing in place. Wound team to place VAC drsg tomorrow 5/9 to abd and change 3x/week.
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
## 2018-05-08 NOTE — CARE PLAN
Problem: Pain Management  Goal: Pain level will decrease to patient's comfort goal  Outcome: PROGRESSING AS EXPECTED  Pt reports acceptable pain level throughout most of shift, and requests pain medication when needed. Pt rested well throughout shift.
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
## 2018-05-09 LAB
ANION GAP SERPL CALC-SCNC: 9 MMOL/L (ref 0–11.9)
BASOPHILS # BLD AUTO: 0.2 % (ref 0–1.8)
BASOPHILS # BLD: 0.02 K/UL (ref 0–0.12)
BUN SERPL-MCNC: 6 MG/DL (ref 8–22)
CALCIUM SERPL-MCNC: 8.2 MG/DL (ref 8.5–10.5)
CHLORIDE SERPL-SCNC: 104 MMOL/L (ref 96–112)
CO2 SERPL-SCNC: 23 MMOL/L (ref 20–33)
CREAT SERPL-MCNC: 0.31 MG/DL (ref 0.5–1.4)
EOSINOPHIL # BLD AUTO: 0.02 K/UL (ref 0–0.51)
EOSINOPHIL NFR BLD: 0.2 % (ref 0–6.9)
ERYTHROCYTE [DISTWIDTH] IN BLOOD BY AUTOMATED COUNT: 38.8 FL (ref 35.9–50)
GLUCOSE SERPL-MCNC: 93 MG/DL (ref 65–99)
HCT VFR BLD AUTO: 22.7 % (ref 37–47)
HGB BLD-MCNC: 7.4 G/DL (ref 12–16)
IMM GRANULOCYTES # BLD AUTO: 0.05 K/UL (ref 0–0.11)
IMM GRANULOCYTES NFR BLD AUTO: 0.5 % (ref 0–0.9)
IRON SATN MFR SERPL: 10 % (ref 15–55)
IRON SERPL-MCNC: 27 UG/DL (ref 40–170)
LYMPHOCYTES # BLD AUTO: 1.57 K/UL (ref 1–4.8)
LYMPHOCYTES NFR BLD: 16.4 % (ref 22–41)
MCH RBC QN AUTO: 23.6 PG (ref 27–33)
MCHC RBC AUTO-ENTMCNC: 32.6 G/DL (ref 33.6–35)
MCV RBC AUTO: 72.3 FL (ref 81.4–97.8)
MONOCYTES # BLD AUTO: 0.96 K/UL (ref 0–0.85)
MONOCYTES NFR BLD AUTO: 10 % (ref 0–13.4)
NEUTROPHILS # BLD AUTO: 6.94 K/UL (ref 2–7.15)
NEUTROPHILS NFR BLD: 72.7 % (ref 44–72)
NRBC # BLD AUTO: 0 K/UL
NRBC BLD-RTO: 0 /100 WBC
PLATELET # BLD AUTO: 228 K/UL (ref 164–446)
PMV BLD AUTO: 10.3 FL (ref 9–12.9)
POTASSIUM SERPL-SCNC: 3.3 MMOL/L (ref 3.6–5.5)
RBC # BLD AUTO: 3.14 M/UL (ref 4.2–5.4)
SODIUM SERPL-SCNC: 136 MMOL/L (ref 135–145)
TIBC SERPL-MCNC: 262 UG/DL (ref 250–450)
WBC # BLD AUTO: 9.6 K/UL (ref 4.8–10.8)

## 2018-05-09 PROCEDURE — 83540 ASSAY OF IRON: CPT

## 2018-05-09 PROCEDURE — 85025 COMPLETE CBC W/AUTO DIFF WBC: CPT

## 2018-05-09 PROCEDURE — 94760 N-INVAS EAR/PLS OXIMETRY 1: CPT

## 2018-05-09 PROCEDURE — 80048 BASIC METABOLIC PNL TOTAL CA: CPT

## 2018-05-09 PROCEDURE — 83550 IRON BINDING TEST: CPT

## 2018-05-09 PROCEDURE — 97161 PT EVAL LOW COMPLEX 20 MIN: CPT

## 2018-05-09 PROCEDURE — 306263 VAC CANNISTER W/GEL 500ML: Performed by: SURGERY

## 2018-05-09 PROCEDURE — A9270 NON-COVERED ITEM OR SERVICE: HCPCS | Performed by: NURSE PRACTITIONER

## 2018-05-09 PROCEDURE — 94668 MNPJ CHEST WALL SBSQ: CPT

## 2018-05-09 PROCEDURE — 770006 HCHG ROOM/CARE - MED/SURG/GYN SEMI*

## 2018-05-09 PROCEDURE — 700112 HCHG RX REV CODE 229: Performed by: NURSE PRACTITIONER

## 2018-05-09 PROCEDURE — 97605 NEG PRS WND THER DME<=50SQCM: CPT

## 2018-05-09 PROCEDURE — 700102 HCHG RX REV CODE 250 W/ 637 OVERRIDE(OP): Performed by: NURSE PRACTITIONER

## 2018-05-09 PROCEDURE — 700111 HCHG RX REV CODE 636 W/ 250 OVERRIDE (IP): Performed by: NURSE PRACTITIONER

## 2018-05-09 PROCEDURE — 36415 COLL VENOUS BLD VENIPUNCTURE: CPT

## 2018-05-09 RX ORDER — POTASSIUM CHLORIDE 20 MEQ/1
40 TABLET, EXTENDED RELEASE ORAL DAILY
Status: DISCONTINUED | OUTPATIENT
Start: 2018-05-09 | End: 2018-05-09

## 2018-05-09 RX ADMIN — BACITRACIN ZINC, AND POLYMYXIN B SULFATE: 500; 10000 OINTMENT TOPICAL at 08:25

## 2018-05-09 RX ADMIN — POTASSIUM BICARBONATE 25 MEQ: 25 TABLET, EFFERVESCENT ORAL at 08:24

## 2018-05-09 RX ADMIN — POTASSIUM BICARBONATE 25 MEQ: 25 TABLET, EFFERVESCENT ORAL at 20:19

## 2018-05-09 RX ADMIN — IRON SUCROSE 200 MG: 20 INJECTION, SOLUTION INTRAVENOUS at 17:11

## 2018-05-09 RX ADMIN — DOCUSATE SODIUM 100 MG: 100 CAPSULE ORAL at 08:23

## 2018-05-09 RX ADMIN — BACITRACIN ZINC, AND POLYMYXIN B SULFATE: 500; 10000 OINTMENT TOPICAL at 17:10

## 2018-05-09 RX ADMIN — OXYCODONE HYDROCHLORIDE 5 MG: 5 TABLET ORAL at 12:57

## 2018-05-09 RX ADMIN — POLYETHYLENE GLYCOL 3350 1 PACKET: 17 POWDER, FOR SOLUTION ORAL at 08:23

## 2018-05-09 RX ADMIN — FAMOTIDINE 20 MG: 20 TABLET ORAL at 08:24

## 2018-05-09 RX ADMIN — MAGNESIUM HYDROXIDE 30 ML: 400 SUSPENSION ORAL at 08:23

## 2018-05-09 ASSESSMENT — PAIN SCALES - GENERAL
PAINLEVEL_OUTOF10: 0
PAINLEVEL_OUTOF10: 4
PAINLEVEL_OUTOF10: ASSUMED PAIN PRESENT
PAINLEVEL_OUTOF10: 0
PAINLEVEL_OUTOF10: ASSUMED PAIN PRESENT
PAINLEVEL_OUTOF10: 6

## 2018-05-09 ASSESSMENT — ENCOUNTER SYMPTOMS
COUGH: 0
TINGLING: 0
DOUBLE VISION: 0
SHORTNESS OF BREATH: 0
WEAKNESS: 0
ABDOMINAL PAIN: 1
BACK PAIN: 0
BLURRED VISION: 0
NAUSEA: 0
CONSTIPATION: 0
VOMITING: 0
FEVER: 0
DIZZINESS: 0
MYALGIAS: 0
HEADACHES: 0

## 2018-05-09 ASSESSMENT — LIFESTYLE VARIABLES: ALCOHOL_USE: NO

## 2018-05-09 NOTE — PROGRESS NOTES
Report received, assumed care.   A&Ox4.   Declines pain at this time.      Vegetarian diet, denies nausea, but does not want to eat at this time.   +voiding, +flatus   Educated pt. On bowel protocol d/t last BM PTA.   1L O2 NC    Ambulating with one assist/handheld assist   Concerns: Pt. Demonstrates flat affect, displays withdrawn behavior.     This RN got pt. Up to the chair for breakfast, pt. Sat in chair for about 10 minutes, but c/o of dizziness and requested to get back in bed. Pt. Returned to bed and positioned for comfort.      Patient call light within reach, bed in the lowest position.   POC discussed with patient.

## 2018-05-09 NOTE — PROGRESS NOTES
IV Iron Per Pharmacy Note    Reason for Iron Replacement:  Acute blood loss       Lab Results   Component Value Date/Time    WBC 9.6 05/09/2018 04:31 AM    RBC 3.14 (L) 05/09/2018 04:31 AM    HEMOGLOBIN 7.4 (L) 05/09/2018 04:31 AM    HEMATOCRIT 22.7 (L) 05/09/2018 04:31 AM    MCV 72.3 (L) 05/09/2018 04:31 AM    MCH 23.6 (L) 05/09/2018 04:31 AM    MCHC 32.6 (L) 05/09/2018 04:31 AM    MPV 10.3 05/09/2018 04:31 AM       Recent Labs      05/09/18   0431   IRON  27         Recent Labs      05/09/18   0431   CREATININE  0.31*          Assessment/Plan:  1. IV Iron Indicated.   2. Give Iron sucrose 200mg IV daily x 5 days.   4. Continue to monitor for adverse drug reactions including: Arthralgia/myalgia, Headache/backache, chills/dizziness/malaise, moderate to high fever and n/v.      Mesha Winchester, BarbaraD

## 2018-05-09 NOTE — CARE PLAN
Problem: Bowel/Gastric:  Goal: Normal bowel function is maintained or improved  Outcome: PROGRESSING SLOWER THAN EXPECTED  Patient has not had a BM this admit.  Patient took her miralax and milk of mag     Problem: Pain Management  Goal: Pain level will decrease to patient's comfort goal  Outcome: PROGRESSING SLOWER THAN EXPECTED  Patient is reluctant to take pain medication, refusing other interventions like ice pack despite education.   Patient has trouble with pain during mobility.  Feels comfortable when in bed.

## 2018-05-09 NOTE — CARE PLAN
Problem: Venous Thromboembolism (VTW)/Deep Vein Thrombosis (DVT) Prevention:  Goal: Patient will participate in Venous Thrombosis (VTE)/Deep Vein Thrombosis (DVT)Prevention Measures  Outcome: PROGRESSING AS EXPECTED  Pt has SCD's in place, and pt is now walking to bathroom    Problem: Respiratory:  Goal: Respiratory status will improve  Outcome: PROGRESSING AS EXPECTED  Able to start titrating O2 sat. Down (from 3L to 2L)

## 2018-05-09 NOTE — PROGRESS NOTES
2 RN check complete.  Pt has small abrasions bilaterally to elbows.  No other skin abnormalities noted to the ears/heals bilaterally, or the saccrum

## 2018-05-09 NOTE — PROGRESS NOTES
Pt is A&O x4.  Pain adequately controlled with 5 oxy  Nausea pt states slight nausea, and decided not to eat dinner tonight  Diet advanced to regular for breakfast, will continue to monitor nausea  Adequate Urine output  - BM Void   + Flatus  Up Assist x1  SCD's on  Many family members have visited  Bed in lowest position and locked.  Pt resting comfortably now.  Review plan of care with patient  Call light within reach  Hourly rounds in place  All needs met at this time
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
## 2018-05-10 ENCOUNTER — HOSPITAL ENCOUNTER (INPATIENT)
Facility: REHABILITATION | Age: 18
End: 2018-05-10
Attending: PHYSICAL MEDICINE & REHABILITATION | Admitting: PHYSICAL MEDICINE & REHABILITATION

## 2018-05-10 LAB
ANION GAP SERPL CALC-SCNC: 7 MMOL/L (ref 0–11.9)
BASOPHILS # BLD AUTO: 0.4 % (ref 0–1.8)
BASOPHILS # BLD: 0.04 K/UL (ref 0–0.12)
BUN SERPL-MCNC: 7 MG/DL (ref 8–22)
CALCIUM SERPL-MCNC: 8.5 MG/DL (ref 8.5–10.5)
CHLORIDE SERPL-SCNC: 101 MMOL/L (ref 96–112)
CO2 SERPL-SCNC: 27 MMOL/L (ref 20–33)
CREAT SERPL-MCNC: 0.38 MG/DL (ref 0.5–1.4)
EOSINOPHIL # BLD AUTO: 0.09 K/UL (ref 0–0.51)
EOSINOPHIL NFR BLD: 0.8 % (ref 0–6.9)
ERYTHROCYTE [DISTWIDTH] IN BLOOD BY AUTOMATED COUNT: 39.7 FL (ref 35.9–50)
GLUCOSE SERPL-MCNC: 121 MG/DL (ref 65–99)
HCT VFR BLD AUTO: 26 % (ref 37–47)
HGB BLD-MCNC: 8.1 G/DL (ref 12–16)
IMM GRANULOCYTES # BLD AUTO: 0.11 K/UL (ref 0–0.11)
IMM GRANULOCYTES NFR BLD AUTO: 1 % (ref 0–0.9)
LYMPHOCYTES # BLD AUTO: 2.06 K/UL (ref 1–4.8)
LYMPHOCYTES NFR BLD: 18.7 % (ref 22–41)
MAGNESIUM SERPL-MCNC: 2 MG/DL (ref 1.5–2.5)
MCH RBC QN AUTO: 22.8 PG (ref 27–33)
MCHC RBC AUTO-ENTMCNC: 31.2 G/DL (ref 33.6–35)
MCV RBC AUTO: 73 FL (ref 81.4–97.8)
MONOCYTES # BLD AUTO: 1.28 K/UL (ref 0–0.85)
MONOCYTES NFR BLD AUTO: 11.6 % (ref 0–13.4)
NEUTROPHILS # BLD AUTO: 7.42 K/UL (ref 2–7.15)
NEUTROPHILS NFR BLD: 67.5 % (ref 44–72)
NRBC # BLD AUTO: 0 K/UL
NRBC BLD-RTO: 0 /100 WBC
PHOSPHATE SERPL-MCNC: 2.4 MG/DL (ref 2.5–4.5)
PLATELET # BLD AUTO: 282 K/UL (ref 164–446)
PMV BLD AUTO: 10.3 FL (ref 9–12.9)
POTASSIUM SERPL-SCNC: 3.1 MMOL/L (ref 3.6–5.5)
RBC # BLD AUTO: 3.56 M/UL (ref 4.2–5.4)
SODIUM SERPL-SCNC: 135 MMOL/L (ref 135–145)
WBC # BLD AUTO: 11 K/UL (ref 4.8–10.8)

## 2018-05-10 PROCEDURE — 85025 COMPLETE CBC W/AUTO DIFF WBC: CPT

## 2018-05-10 PROCEDURE — A9270 NON-COVERED ITEM OR SERVICE: HCPCS | Performed by: NURSE PRACTITIONER

## 2018-05-10 PROCEDURE — 700111 HCHG RX REV CODE 636 W/ 250 OVERRIDE (IP): Performed by: NURSE PRACTITIONER

## 2018-05-10 PROCEDURE — 97116 GAIT TRAINING THERAPY: CPT

## 2018-05-10 PROCEDURE — 36415 COLL VENOUS BLD VENIPUNCTURE: CPT

## 2018-05-10 PROCEDURE — 770006 HCHG ROOM/CARE - MED/SURG/GYN SEMI*

## 2018-05-10 PROCEDURE — 700102 HCHG RX REV CODE 250 W/ 637 OVERRIDE(OP): Performed by: NURSE PRACTITIONER

## 2018-05-10 PROCEDURE — 94669 MECHANICAL CHEST WALL OSCILL: CPT

## 2018-05-10 PROCEDURE — 84100 ASSAY OF PHOSPHORUS: CPT

## 2018-05-10 PROCEDURE — 94760 N-INVAS EAR/PLS OXIMETRY 1: CPT

## 2018-05-10 PROCEDURE — 97530 THERAPEUTIC ACTIVITIES: CPT

## 2018-05-10 PROCEDURE — 97535 SELF CARE MNGMENT TRAINING: CPT

## 2018-05-10 PROCEDURE — 83735 ASSAY OF MAGNESIUM: CPT

## 2018-05-10 PROCEDURE — 700112 HCHG RX REV CODE 229: Performed by: NURSE PRACTITIONER

## 2018-05-10 PROCEDURE — 80048 BASIC METABOLIC PNL TOTAL CA: CPT

## 2018-05-10 PROCEDURE — 94668 MNPJ CHEST WALL SBSQ: CPT

## 2018-05-10 RX ADMIN — DOCUSATE SODIUM 100 MG: 100 CAPSULE ORAL at 09:15

## 2018-05-10 RX ADMIN — OXYCODONE HYDROCHLORIDE 5 MG: 5 TABLET ORAL at 02:55

## 2018-05-10 RX ADMIN — IRON SUCROSE 200 MG: 20 INJECTION, SOLUTION INTRAVENOUS at 09:11

## 2018-05-10 RX ADMIN — BACITRACIN ZINC, AND POLYMYXIN B SULFATE: 500; 10000 OINTMENT TOPICAL at 09:10

## 2018-05-10 RX ADMIN — BACITRACIN ZINC, AND POLYMYXIN B SULFATE: 500; 10000 OINTMENT TOPICAL at 20:42

## 2018-05-10 RX ADMIN — POTASSIUM BICARBONATE 25 MEQ: 25 TABLET, EFFERVESCENT ORAL at 20:38

## 2018-05-10 RX ADMIN — MAGNESIUM HYDROXIDE 30 ML: 400 SUSPENSION ORAL at 09:15

## 2018-05-10 RX ADMIN — POTASSIUM BICARBONATE 25 MEQ: 25 TABLET, EFFERVESCENT ORAL at 09:11

## 2018-05-10 ASSESSMENT — GAIT ASSESSMENTS
DISTANCE (FEET): 40
DEVIATION: ATAXIC;BRADYKINETIC;SHUFFLED GAIT
ASSISTIVE DEVICE: HAND HELD ASSIST
GAIT LEVEL OF ASSIST: MINIMAL ASSIST

## 2018-05-10 ASSESSMENT — COGNITIVE AND FUNCTIONAL STATUS - GENERAL
CLIMB 3 TO 5 STEPS WITH RAILING: A LOT
MOBILITY SCORE: 11
PERSONAL GROOMING: A LITTLE
TOILETING: A LITTLE
SUGGESTED CMS G CODE MODIFIER DAILY ACTIVITY: CK
STANDING UP FROM CHAIR USING ARMS: A LITTLE
HELP NEEDED FOR BATHING: A LOT
MOVING TO AND FROM BED TO CHAIR: UNABLE
DRESSING REGULAR LOWER BODY CLOTHING: A LOT
WALKING IN HOSPITAL ROOM: A LOT
SUGGESTED CMS G CODE MODIFIER MOBILITY: CL
MOVING FROM LYING ON BACK TO SITTING ON SIDE OF FLAT BED: UNABLE
DRESSING REGULAR UPPER BODY CLOTHING: A LITTLE
DAILY ACTIVITIY SCORE: 17
TURNING FROM BACK TO SIDE WHILE IN FLAT BAD: A LOT

## 2018-05-10 ASSESSMENT — PAIN SCALES - GENERAL
PAINLEVEL_OUTOF10: 5
PAINLEVEL_OUTOF10: ASSUMED PAIN PRESENT
PAINLEVEL_OUTOF10: 2
PAINLEVEL_OUTOF10: ASSUMED PAIN PRESENT

## 2018-05-10 NOTE — DISCHARGE PLANNING
Attempted to meet client at bedside to discuss IRF referral. Pt in bathroom. Provided [facility] brochure to family member at bedside. Will follow.
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
## 2018-05-10 NOTE — PROGRESS NOTES
Patient transferred to [facility] via hospital bed with all belongings.   Nurses at bedside upon transfer, all questions answered.

## 2018-05-10 NOTE — THERAPY
"Physical Therapy Treatment completed.   Bed Mobility:  Supine to Sit: Moderate Assist (LE and trunk assistance d/t abdominal pain)  Transfers: Sit to Stand: Contact Guard Assist  Gait: Level Of Assist: Minimal Assist with Hand Held Assist       Plan of Care: Will benefit from Physical Therapy 4 times per week  Discharge Recommendations: Equipment: Will Continue to Assess for Equipment Needs.     See \"Rehab Therapy-Acute\" Patient Summary Report for complete documentation.     Pt presenting w/ improved functional mobility from initial eval. Pt appeared more alert however had delayed reactions and did not maintain eye contact. Pt mobility is limited by pain in her abdomen and unable to utilize compensatory strategies to assist w/ ease of mobility. Pt was able to increase ambulation distance however had instances of ataxic gait pattern and multiple minor LOB. Pt unable to maintain standing position for long d/t pain and was noticed to have an unfocused gaze. Pt appears to have some poor insight into deficits as she feels that she is able to DC home.  "

## 2018-05-10 NOTE — DISCHARGE PLANNING
Rehab: Rehab     Action: Spoke to Za from Rehab. Physiatrist has determined that pt a good rehab candidate. They will seek auth from insurance. Home wound vac paperwork not complete, not submitted as pt will likely to to Acute Rehab    Barriers to Discharge: none     Plan: Acute Rehab

## 2018-05-10 NOTE — FLOWSHEET NOTE
Respiratory Care     05/10/18 0759   PEP/CPT Group   #PEP/CPT (Manual) Subsequent Subsequent   PEP/CPT Method Positive Airway Pressure Device   Pressure 15   Incentive Spirometry Group   Breathing Exercises Yes   Incentive Spirometer Volume 1000 mL   Respiratory WDL   Respiratory (WDL) X   Chest Exam   Respiration 16   Pulse 86   Breath Sounds   LLL Breath Sounds Diminished   Oximetry   Continuous Oximetry Yes   Oxygen   Pulse Oximetry 92 %   O2 (LPM) 0   O2 Daily Delivery Respiratory  Room Air with O2 Available

## 2018-05-10 NOTE — DISCHARGE PLANNING
Thank you for the opportunity to assist with this patient as they transition to post acute services.  We are aware of the Rehab referral from ADDIE Abraham.  Dr. Cobb to consult this referral. Our Transitional Case Coordinator will follow. At this time patient is showing to have [employer] as their coverage.   Please do not hesitate to call us if you require additional assistance my phone number is [phone] Jareth

## 2018-05-10 NOTE — FLOWSHEET NOTE
05/10/18 1155   Therapy Not Performed   Type of Therapy Not Performed PEP   Reason Therapy Not Performed Refused   Incentive Spirometry Group   Breathing Exercises Yes   Incentive Spirometer Volume 1000 mL   Respiratory WDL   Respiratory (WDL) X   Chest Exam   Respiration 16   Pulse 80   Breath Sounds   RLL Breath Sounds Diminished   LLL Breath Sounds Diminished

## 2018-05-10 NOTE — WOUND TEAM
[facility]  Inpatient Services  Initial Wound and Skin Care Evaluation    Admission Date:   5/5/18  HPI, PMH, SH: Reviewed  Unit where seen by Wound Team:   T4    WOUND CONSULT RELATED TO:   md request for npwt drsg placement and changes per protocol    SUBJECTIVE:  Pleasant/agreeable    Self Report / Pain Level:  kristy well with pre-med    OBJECTIVE:   Wet-to-dry drsg in place    WOUND TYPE, LOCATION, CHARACTERISTICS (Pressure ulcers: location, stage, POA or date identified)    Location and type of wound: Small dehisced area at distal end of abdominal incision        Periwound:      intact  Drainage:      Min serosanguinous  Tissue Type and %:     100% red  Wound Edges:     attached  Odor:       none  Exposed structure(s):   none  S&S of Infection:    none      Measurements: (cm)   Taken 5/9/18  Length:      0.3  Width:       1.0  Depth:      0.2      INTERVENTIONS BY WOUND TEAM:  Prev drsg removed -- area irrigated with wnd cleanser -- measurements and photograph done -- benzoin and drape myriam-wnd -- black foam button to cover the wnd -- sealed with the trac pad -- cont neg pressure applied at 125mmhg -- wnd team to change the drsg weekly    Interdisciplinary consultation: nsg; pt; family    EVALUATION:  Small dehisced area at the distal end of the abdominal incision; npwt drsg to control drainage; wnd team will change weekly; npwt can be replaced upon hospital d/c or sooner if no drainage    Factors affecting wound healing: none    Goals: control drainage; maintain integrity of the distal incision    NURSING PLAN OF CARE ORDERS (x):    Dressing changes: See Dressing Maintenance orders: x  Skin care: See Skin Care orders: x  Rectal tube care: See Rectal Tube Care orders:   Other orders:    RSKIN: CURRENT (X) ORDERED (O)  Q shift Anton:  X  Q shift pressure point assessments:  X  Pressure redistribution mattress        DENITA      Bariatric DENITA      Bariatric foam        Heel float boots       Heels  floated on pillows    x  Barrier wipes      Barrier Cream      Barrier paste      Sacral silicone dressing      Silicone O2 tubing      Anchorfast      Trach with Optifoam split foam       Waffle cushion      Rectal tube or BMS      Antifungal tx    Turn q 2 hours   x  Up to chair     Ambulate   PT/OT     Dietician      PO     TF   TPN     PVN    NPO   # days   Other       WOUND TEAM PLAN OF CARE (x):   NPWT change 3 x week:        Dressing changes by wound team:       Follow up as needed:     x  Other (explain):   npwt drsg changes weekly    Anticipated discharge plans (x):  SNF:           Home Care:           Outpatient Wound Center:            Self Care:        x    Other:

## 2018-05-10 NOTE — CARE PLAN
Problem: Medication  Goal: Compliance with prescribed medication will improve  Outcome: PROGRESSING SLOWER THAN EXPECTED  Pt refusing bowel protocol at this time. Pt has not had a bowel movement since prior to arrival. Education provided. Bowel sounds hypoactive in all quadrants.

## 2018-05-10 NOTE — DISCHARGE PLANNING
Physiatry Dr. Cobb consult recommending pt appropriate for IRF level care. [facility] will seek authorization for inpatient rehab.

## 2018-05-10 NOTE — THERAPY
"Occupational Therapy Treatment completed with focus on ADLs, ADL transfers and patient education.  Functional Status:  Pt seen for OT tx. Mod A supine > sit, required assistance for LEs and trunk d/t abdominal pain but initiated LE movement. Max A for LB dressing d/t pain. Pt tolerated amb w/ HHA. Pt tolerated increased OOB activity tolerance this session from previous session. Pt demonstrated ability to complete sit > stands w/ HHA for balance and safety. Pt pleasant but with delayed responses and minimal responses/interaction w/ therapist. Pt encouraged to continue getting OOB w/ nrsg and to get into chair for meals. Pt receptive to education.   Plan of Care: Will benefit from Occupational Therapy 3 times per week  Discharge Recommendations:  Equipment Will Continue to Assess for Equipment Needs.     See \"Rehab Therapy-Acute\" Patient Summary Report for complete documentation.   "
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
## 2018-05-10 NOTE — CARE PLAN
Problem: Communication  Goal: The ability to communicate needs accurately and effectively will improve  Outcome: PROGRESSING SLOWER THAN EXPECTED  Report given bedside. Pt encouraged to to voice feelings and needs. Pt does not call for help on call light. Pt does not voice needs or concerns. Call light with in reach. Hourly rounding in place.
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
## 2018-05-10 NOTE — CARE PLAN
Problem: Ventilation Defect:  Goal: Ability to achieve and maintain unassisted ventilation or tolerate decreased levels of ventilator support  Outcome: MET Date Met: 05/10/18

## 2018-05-11 ENCOUNTER — APPOINTMENT (OUTPATIENT)
Dept: RADIOLOGY | Facility: MEDICAL CENTER | Age: 18
DRG: 329 | End: 2018-05-11
Attending: NURSE PRACTITIONER
Payer: COMMERCIAL

## 2018-05-11 PROBLEM — D72.829 LEUKOCYTOSIS: Status: ACTIVE | Noted: 2018-05-11

## 2018-05-11 LAB
ALBUMIN SERPL BCP-MCNC: 2.9 G/DL (ref 3.2–4.9)
ALBUMIN/GLOB SERPL: 1.2 G/DL
ALP SERPL-CCNC: 48 U/L (ref 30–99)
ALT SERPL-CCNC: 17 U/L (ref 2–50)
ANION GAP SERPL CALC-SCNC: 8 MMOL/L (ref 0–11.9)
AST SERPL-CCNC: 17 U/L (ref 12–45)
BASOPHILS # BLD AUTO: 0.5 % (ref 0–1.8)
BASOPHILS # BLD: 0.06 K/UL (ref 0–0.12)
BILIRUB SERPL-MCNC: 0.5 MG/DL (ref 0.1–1.5)
BUN SERPL-MCNC: 7 MG/DL (ref 8–22)
CALCIUM SERPL-MCNC: 8.3 MG/DL (ref 8.5–10.5)
CHLORIDE SERPL-SCNC: 103 MMOL/L (ref 96–112)
CO2 SERPL-SCNC: 25 MMOL/L (ref 20–33)
CREAT SERPL-MCNC: 0.34 MG/DL (ref 0.5–1.4)
EOSINOPHIL # BLD AUTO: 0.11 K/UL (ref 0–0.51)
EOSINOPHIL NFR BLD: 0.8 % (ref 0–6.9)
ERYTHROCYTE [DISTWIDTH] IN BLOOD BY AUTOMATED COUNT: 39.2 FL (ref 35.9–50)
GLOBULIN SER CALC-MCNC: 2.4 G/DL (ref 1.9–3.5)
GLUCOSE SERPL-MCNC: 109 MG/DL (ref 65–99)
HCT VFR BLD AUTO: 26.2 % (ref 37–47)
HGB BLD-MCNC: 8.2 G/DL (ref 12–16)
IMM GRANULOCYTES # BLD AUTO: 0.44 K/UL (ref 0–0.11)
IMM GRANULOCYTES NFR BLD AUTO: 3.4 % (ref 0–0.9)
LYMPHOCYTES # BLD AUTO: 2.5 K/UL (ref 1–4.8)
LYMPHOCYTES NFR BLD: 19.1 % (ref 22–41)
MCH RBC QN AUTO: 23 PG (ref 27–33)
MCHC RBC AUTO-ENTMCNC: 31.3 G/DL (ref 33.6–35)
MCV RBC AUTO: 73.4 FL (ref 81.4–97.8)
MONOCYTES # BLD AUTO: 1.49 K/UL (ref 0–0.85)
MONOCYTES NFR BLD AUTO: 11.4 % (ref 0–13.4)
NEUTROPHILS # BLD AUTO: 8.51 K/UL (ref 2–7.15)
NEUTROPHILS NFR BLD: 64.8 % (ref 44–72)
NRBC # BLD AUTO: 0.03 K/UL
NRBC BLD-RTO: 0.2 /100 WBC
PLATELET # BLD AUTO: 329 K/UL (ref 164–446)
PMV BLD AUTO: 9.8 FL (ref 9–12.9)
POTASSIUM SERPL-SCNC: 3 MMOL/L (ref 3.6–5.5)
PROT SERPL-MCNC: 5.3 G/DL (ref 6–8.2)
RBC # BLD AUTO: 3.57 M/UL (ref 4.2–5.4)
SODIUM SERPL-SCNC: 136 MMOL/L (ref 135–145)
WBC # BLD AUTO: 13.1 K/UL (ref 4.8–10.8)

## 2018-05-11 PROCEDURE — 770006 HCHG ROOM/CARE - MED/SURG/GYN SEMI*

## 2018-05-11 PROCEDURE — 700111 HCHG RX REV CODE 636 W/ 250 OVERRIDE (IP): Performed by: NURSE PRACTITIONER

## 2018-05-11 PROCEDURE — 94668 MNPJ CHEST WALL SBSQ: CPT

## 2018-05-11 PROCEDURE — 80053 COMPREHEN METABOLIC PANEL: CPT

## 2018-05-11 PROCEDURE — A9270 NON-COVERED ITEM OR SERVICE: HCPCS | Performed by: SURGERY

## 2018-05-11 PROCEDURE — 700102 HCHG RX REV CODE 250 W/ 637 OVERRIDE(OP): Performed by: NURSE PRACTITIONER

## 2018-05-11 PROCEDURE — 36415 COLL VENOUS BLD VENIPUNCTURE: CPT

## 2018-05-11 PROCEDURE — 85025 COMPLETE CBC W/AUTO DIFF WBC: CPT

## 2018-05-11 PROCEDURE — 71046 X-RAY EXAM CHEST 2 VIEWS: CPT

## 2018-05-11 PROCEDURE — 700102 HCHG RX REV CODE 250 W/ 637 OVERRIDE(OP): Performed by: SURGERY

## 2018-05-11 PROCEDURE — A9270 NON-COVERED ITEM OR SERVICE: HCPCS | Performed by: NURSE PRACTITIONER

## 2018-05-11 RX ORDER — POTASSIUM CHLORIDE 20 MEQ/1
40 TABLET, EXTENDED RELEASE ORAL 2 TIMES DAILY
Status: DISCONTINUED | OUTPATIENT
Start: 2018-05-11 | End: 2018-05-16

## 2018-05-11 RX ORDER — POTASSIUM CHLORIDE 20 MEQ/1
40 TABLET, EXTENDED RELEASE ORAL DAILY
Status: DISCONTINUED | OUTPATIENT
Start: 2018-05-11 | End: 2018-05-11

## 2018-05-11 RX ADMIN — ENOXAPARIN SODIUM 30 MG: 100 INJECTION SUBCUTANEOUS at 16:14

## 2018-05-11 RX ADMIN — POTASSIUM CHLORIDE 40 MEQ: 1500 TABLET, EXTENDED RELEASE ORAL at 20:13

## 2018-05-11 RX ADMIN — BACITRACIN ZINC, AND POLYMYXIN B SULFATE: 500; 10000 OINTMENT TOPICAL at 09:14

## 2018-05-11 RX ADMIN — BACITRACIN ZINC, AND POLYMYXIN B SULFATE: 500; 10000 OINTMENT TOPICAL at 20:19

## 2018-05-11 RX ADMIN — BACITRACIN ZINC, AND POLYMYXIN B SULFATE: 500; 10000 OINTMENT TOPICAL at 16:17

## 2018-05-11 RX ADMIN — IRON SUCROSE 200 MG: 20 INJECTION, SOLUTION INTRAVENOUS at 09:14

## 2018-05-11 RX ADMIN — POTASSIUM CHLORIDE 40 MEQ: 1500 TABLET, EXTENDED RELEASE ORAL at 09:05

## 2018-05-11 RX ADMIN — ENOXAPARIN SODIUM 30 MG: 100 INJECTION SUBCUTANEOUS at 20:13

## 2018-05-11 ASSESSMENT — ENCOUNTER SYMPTOMS
ABDOMINAL PAIN: 1
RESPIRATORY NEGATIVE: 1
CONSTITUTIONAL NEGATIVE: 1
MUSCULOSKELETAL NEGATIVE: 1
ROS GI COMMENTS: BM 5/10
NEUROLOGICAL NEGATIVE: 1

## 2018-05-11 ASSESSMENT — PAIN SCALES - GENERAL: PAINLEVEL_OUTOF10: 2
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
## 2018-05-11 NOTE — CARE PLAN
Problem: Communication  Goal: The ability to communicate needs accurately and effectively will improve  Outcome: PROGRESSING AS EXPECTED  POC discussed. Explained to patient that pharmacy cannot change the form of klyte since they don't carry the dosage ordered but will let the MD know in the morning.    Problem: Safety  Goal: Will remain free from injury  Outcome: PROGRESSING AS EXPECTED  Provided assistance with mobility. Fall prevention measures in place. rounds ongoing.

## 2018-05-11 NOTE — PROGRESS NOTES
A/0x3, flat affect, VSS, mild pain declined pain medication today,  Ambulating to bathroom with x1 hand held assist, poor appetite but making an effort to eat some at each meal and family brought small snacks from home.     Discussed plan of care and questions answered.
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
## 2018-05-11 NOTE — DISCHARGE PLANNING
Anticipated Discharge Disposition: Rehab vs home with home wound vac.     Action: [facility] Authorization form signed by attending trauma APRN in case pt is declined by rehab. LSW faxed form to [facility] at fax number [phone].     Barriers to Discharge: Financial barriers.      Plan: [facility]

## 2018-05-11 NOTE — PREADMISSION SCREENING NOTE
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  Pain Management  IV Site: Peripheral  Current Medications:    Current Facility-Administered Medications Ordered in Epic   Medication Dose Route Frequency Provider Last Rate Last Dose   • potassium chloride SA (Kdur) tablet 40 mEq  40 mEq Oral DAILY Joseluis Gabriel M.D.   40 mEq at 05/11/18 0905   • iron sucrose (VENOFER) injection 200 mg  200 mg Intravenous DAILY Juliet Domínguez A.P.R.N.   200 mg at 05/10/18 0911   • morphine (pf) 4 mg/ml injection 1-4 mg  1-4 mg Intravenous Q3HRS PRN Macrina Lima, A.P.R.N.       • ondansetron (ZOFRAN) syringe/vial injection 4 mg  4 mg Intravenous Q4HRS PRN Macrina Lima, A.P.R.N.       • Pharmacy Consult Request ...Pain Management Review 1 Each  1 Each Other PRN Elle Diez A.P.N.       • docusate sodium (COLACE) capsule 100 mg  100 mg Oral BID Elle Diez A.P.N.   Stopped at 05/11/18 0900   • senna-docusate (PERICOLACE or SENOKOT S) 8.6-50 MG per tablet 1 Tab  1 Tab Oral Nightly Elle Diez, A.P.N.   1 Tab at 05/08/18 2110   • senna-docusate (PERICOLACE or SENOKOT S) 8.6-50 MG per tablet 1 Tab  1 Tab Oral Q24HRS PRN Elle Diez A.P.N.       • polyethylene  glycol/lytes (MIRALAX) PACKET 1 Packet  1 Packet Oral BID Elle Diez, A.P.N.   Stopped at 05/11/18 0900   • magnesium hydroxide (MILK OF MAGNESIA) suspension 30 mL  30 mL Oral DAILY Elle Diez A.P.N.   Stopped at 05/11/18 0900   • bisacodyl (DULCOLAX) suppository 10 mg  10 mg Rectal Q24HRS PRN Elle Diez, A.P.N.       • fleet enema 133 mL  1 Each Rectal Once PRN Elle Diez, A.P.N.       • oxyCODONE immediate release (ROXICODONE) tablet 10 mg  10 mg Oral Q3HRS PRN Elle Diez, A.P.N.       • oxyCODONE immediate release (ROXICODONE) tablet 5 mg  5 mg Oral Q3HRS PRN Elle Diez A.P.N.   5 mg at 05/10/18 0255   • bacitracin-polymyxin b (POLYSPORIN) 500-86180 UNIT/GM ointment   Topical TID Elel Diez, A.P.N.       • acetaminophen (TYLENOL) tablet 650 mg  650 mg Oral Q4HRS PRN Elle Diez, A.P.N.        Or   • acetaminophen (TYLENOL) suppository 650 mg  650 mg Rectal Q4HRS PRN Elle Diez, A.P.N.       • Respiratory Care per Protocol   Nebulization Continuous RT Elle Diez, A.P.N.         No current Psychiatric-ordered outpatient prescriptions on file.     Diet:   DIET ORDERS (Through next 24h)    Start     Ordered    05/09/18 0700  DIET ORDER  START AT BREAKFAST     Question Answer Comment   Diet: Regular    Miscellaneous modifications: Vegetarian        05/08/18 2103          Anticipated Discharge Destination / Patient/Family Goal:  Destination: Home with Assistance Support System: Family   Anticipated home health services: OT and PT  Previously used HH service/ provider: Not Applicable  Anticipated DME Needs: [facility] [employer] [facility]  Outpatient Services: OT and PT  Alternative resources to address additional identified needs:     Pre-Screen Completed: 5/11/2018 9:04 AM David Severino L.P.N.

## 2018-05-11 NOTE — PROGRESS NOTES
Spoke to MD Leonidas Gabriel . Patient has not been finishing her potassium everescent tab due to disliking the taste . Patient says she would prefer the pill instead. Potassium is 3.0 this am.     MD Gabriel gave verbal order to change current potassium order to tab's and increase to 40meq at this time.

## 2018-05-12 LAB
ANION GAP SERPL CALC-SCNC: 7 MMOL/L (ref 0–11.9)
ANISOCYTOSIS BLD QL SMEAR: ABNORMAL
BASOPHILS # BLD AUTO: 0.9 % (ref 0–1.8)
BASOPHILS # BLD: 0.13 K/UL (ref 0–0.12)
BUN SERPL-MCNC: 6 MG/DL (ref 8–22)
CALCIUM SERPL-MCNC: 8.4 MG/DL (ref 8.5–10.5)
CHLORIDE SERPL-SCNC: 105 MMOL/L (ref 96–112)
CO2 SERPL-SCNC: 25 MMOL/L (ref 20–33)
CREAT SERPL-MCNC: 0.41 MG/DL (ref 0.5–1.4)
EOSINOPHIL # BLD AUTO: 0.13 K/UL (ref 0–0.51)
EOSINOPHIL NFR BLD: 0.9 % (ref 0–6.9)
ERYTHROCYTE [DISTWIDTH] IN BLOOD BY AUTOMATED COUNT: 41.7 FL (ref 35.9–50)
GLUCOSE SERPL-MCNC: 109 MG/DL (ref 65–99)
HCT VFR BLD AUTO: 27.4 % (ref 37–47)
HGB BLD-MCNC: 8.5 G/DL (ref 12–16)
LG PLATELETS BLD QL SMEAR: NORMAL
LYMPHOCYTES # BLD AUTO: 1.73 K/UL (ref 1–4.8)
LYMPHOCYTES NFR BLD: 12.2 % (ref 22–41)
MACROCYTES BLD QL SMEAR: ABNORMAL
MANUAL DIFF BLD: NORMAL
MCH RBC QN AUTO: 23.8 PG (ref 27–33)
MCHC RBC AUTO-ENTMCNC: 31 G/DL (ref 33.6–35)
MCV RBC AUTO: 76.8 FL (ref 81.4–97.8)
MICROCYTES BLD QL SMEAR: ABNORMAL
MONOCYTES # BLD AUTO: 0.74 K/UL (ref 0–0.85)
MONOCYTES NFR BLD AUTO: 5.2 % (ref 0–13.4)
MORPHOLOGY BLD-IMP: NORMAL
MYELOCYTES NFR BLD MANUAL: 1.7 %
NEUTROPHILS # BLD AUTO: 11.23 K/UL (ref 2–7.15)
NEUTROPHILS NFR BLD: 78.2 % (ref 44–72)
NEUTS BAND NFR BLD MANUAL: 0.9 % (ref 0–10)
NRBC # BLD AUTO: 0.02 K/UL
NRBC BLD-RTO: 0.1 /100 WBC
OVALOCYTES BLD QL SMEAR: NORMAL
PLATELET # BLD AUTO: 382 K/UL (ref 164–446)
PLATELET BLD QL SMEAR: NORMAL
PMV BLD AUTO: 10.4 FL (ref 9–12.9)
POIKILOCYTOSIS BLD QL SMEAR: NORMAL
POLYCHROMASIA BLD QL SMEAR: NORMAL
POTASSIUM SERPL-SCNC: 3.8 MMOL/L (ref 3.6–5.5)
RBC # BLD AUTO: 3.57 M/UL (ref 4.2–5.4)
RBC BLD AUTO: PRESENT
SODIUM SERPL-SCNC: 137 MMOL/L (ref 135–145)
WBC # BLD AUTO: 14.2 K/UL (ref 4.8–10.8)

## 2018-05-12 PROCEDURE — 770006 HCHG ROOM/CARE - MED/SURG/GYN SEMI*

## 2018-05-12 PROCEDURE — 700102 HCHG RX REV CODE 250 W/ 637 OVERRIDE(OP): Performed by: NURSE PRACTITIONER

## 2018-05-12 PROCEDURE — 85007 BL SMEAR W/DIFF WBC COUNT: CPT

## 2018-05-12 PROCEDURE — 700111 HCHG RX REV CODE 636 W/ 250 OVERRIDE (IP): Performed by: NURSE PRACTITIONER

## 2018-05-12 PROCEDURE — A9270 NON-COVERED ITEM OR SERVICE: HCPCS | Performed by: NURSE PRACTITIONER

## 2018-05-12 PROCEDURE — 36415 COLL VENOUS BLD VENIPUNCTURE: CPT

## 2018-05-12 PROCEDURE — 80048 BASIC METABOLIC PNL TOTAL CA: CPT

## 2018-05-12 PROCEDURE — 85027 COMPLETE CBC AUTOMATED: CPT

## 2018-05-12 PROCEDURE — 94668 MNPJ CHEST WALL SBSQ: CPT

## 2018-05-12 RX ADMIN — POTASSIUM CHLORIDE 40 MEQ: 1500 TABLET, EXTENDED RELEASE ORAL at 21:51

## 2018-05-12 RX ADMIN — ENOXAPARIN SODIUM 30 MG: 100 INJECTION SUBCUTANEOUS at 10:01

## 2018-05-12 RX ADMIN — BACITRACIN ZINC, AND POLYMYXIN B SULFATE: 500; 10000 OINTMENT TOPICAL at 21:51

## 2018-05-12 RX ADMIN — IRON SUCROSE 200 MG: 20 INJECTION, SOLUTION INTRAVENOUS at 10:02

## 2018-05-12 RX ADMIN — POTASSIUM CHLORIDE 40 MEQ: 1500 TABLET, EXTENDED RELEASE ORAL at 10:02

## 2018-05-12 ASSESSMENT — ENCOUNTER SYMPTOMS
ABDOMINAL PAIN: 1
NEUROLOGICAL NEGATIVE: 1
CONSTITUTIONAL NEGATIVE: 1
MUSCULOSKELETAL NEGATIVE: 1
RESPIRATORY NEGATIVE: 1
ROS GI COMMENTS: BM 5/10

## 2018-05-12 ASSESSMENT — PAIN SCALES - GENERAL: PAINLEVEL_OUTOF10: 0

## 2018-05-12 NOTE — CARE PLAN
Problem: Safety  Goal: Will remain free from injury  Outcome: PROGRESSING AS EXPECTED  Provided assistance with mobility. Fall prevention measures in place. rounds ongoing.    Problem: Venous Thromboembolism (VTW)/Deep Vein Thrombosis (DVT) Prevention:  Goal: Patient will participate in Venous Thrombosis (VTE)/Deep Vein Thrombosis (DVT)Prevention Measures  Outcome: PROGRESSING AS EXPECTED  Pharmacologic prophylaxis given as ordered. Educated on the use of SCDs and importance of mobility for DVT prevention.

## 2018-05-12 NOTE — CARE PLAN
Problem: Bowel/Gastric:  Goal: Will not experience complications related to bowel motility    Intervention: Implement interventions to promote bowel evacuation if inadequate bowel movements in past 48 hours  +BM      Problem: Mobility  Goal: Risk for activity intolerance will decrease    Intervention: Assess and monitor signs of activity intolerance  Pt up standby assistance.
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
## 2018-05-13 LAB
ANISOCYTOSIS BLD QL SMEAR: ABNORMAL
APPEARANCE UR: CLEAR
BACTERIA #/AREA URNS HPF: ABNORMAL /HPF
BASOPHILS # BLD AUTO: 0 % (ref 0–1.8)
BASOPHILS # BLD: 0 K/UL (ref 0–0.12)
BILIRUB UR QL STRIP.AUTO: NEGATIVE
COLOR UR: YELLOW
EOSINOPHIL # BLD AUTO: 0.12 K/UL (ref 0–0.51)
EOSINOPHIL NFR BLD: 0.9 % (ref 0–6.9)
EPI CELLS #/AREA URNS HPF: ABNORMAL /HPF
ERYTHROCYTE [DISTWIDTH] IN BLOOD BY AUTOMATED COUNT: 42.4 FL (ref 35.9–50)
GLUCOSE UR STRIP.AUTO-MCNC: NEGATIVE MG/DL
HCT VFR BLD AUTO: 29.9 % (ref 37–47)
HGB BLD-MCNC: 9.2 G/DL (ref 12–16)
HYALINE CASTS #/AREA URNS LPF: ABNORMAL /LPF
KETONES UR STRIP.AUTO-MCNC: 80 MG/DL
LEUKOCYTE ESTERASE UR QL STRIP.AUTO: NEGATIVE
LYMPHOCYTES # BLD AUTO: 1.08 K/UL (ref 1–4.8)
LYMPHOCYTES NFR BLD: 7.9 % (ref 22–41)
MANUAL DIFF BLD: NORMAL
MCH RBC QN AUTO: 23.8 PG (ref 27–33)
MCHC RBC AUTO-ENTMCNC: 30.8 G/DL (ref 33.6–35)
MCV RBC AUTO: 77.3 FL (ref 81.4–97.8)
MICRO URNS: ABNORMAL
MICROCYTES BLD QL SMEAR: ABNORMAL
MONOCYTES # BLD AUTO: 1.32 K/UL (ref 0–0.85)
MONOCYTES NFR BLD AUTO: 9.6 % (ref 0–13.4)
MORPHOLOGY BLD-IMP: NORMAL
NEUTROPHILS # BLD AUTO: 11.18 K/UL (ref 2–7.15)
NEUTROPHILS NFR BLD: 81.6 % (ref 44–72)
NITRITE UR QL STRIP.AUTO: NEGATIVE
NRBC # BLD AUTO: 0 K/UL
NRBC BLD-RTO: 0 /100 WBC
PH UR STRIP.AUTO: 6.5 [PH]
PLATELET # BLD AUTO: 428 K/UL (ref 164–446)
PLATELET BLD QL SMEAR: NORMAL
PMV BLD AUTO: 9.8 FL (ref 9–12.9)
POLYCHROMASIA BLD QL SMEAR: NORMAL
PROT UR QL STRIP: NEGATIVE MG/DL
RBC # BLD AUTO: 3.87 M/UL (ref 4.2–5.4)
RBC # URNS HPF: ABNORMAL /HPF
RBC BLD AUTO: PRESENT
RBC UR QL AUTO: ABNORMAL
SP GR UR STRIP.AUTO: 1.01
UROBILINOGEN UR STRIP.AUTO-MCNC: 1 MG/DL
WBC # BLD AUTO: 13.7 K/UL (ref 4.8–10.8)
WBC #/AREA URNS HPF: ABNORMAL /HPF

## 2018-05-13 PROCEDURE — 94760 N-INVAS EAR/PLS OXIMETRY 1: CPT

## 2018-05-13 PROCEDURE — 94668 MNPJ CHEST WALL SBSQ: CPT

## 2018-05-13 PROCEDURE — A9270 NON-COVERED ITEM OR SERVICE: HCPCS | Performed by: NURSE PRACTITIONER

## 2018-05-13 PROCEDURE — 81001 URINALYSIS AUTO W/SCOPE: CPT

## 2018-05-13 PROCEDURE — 700102 HCHG RX REV CODE 250 W/ 637 OVERRIDE(OP): Performed by: NURSE PRACTITIONER

## 2018-05-13 PROCEDURE — 36415 COLL VENOUS BLD VENIPUNCTURE: CPT

## 2018-05-13 PROCEDURE — 85027 COMPLETE CBC AUTOMATED: CPT

## 2018-05-13 PROCEDURE — 700111 HCHG RX REV CODE 636 W/ 250 OVERRIDE (IP): Performed by: NURSE PRACTITIONER

## 2018-05-13 PROCEDURE — 700112 HCHG RX REV CODE 229: Performed by: NURSE PRACTITIONER

## 2018-05-13 PROCEDURE — 770006 HCHG ROOM/CARE - MED/SURG/GYN SEMI*

## 2018-05-13 PROCEDURE — 85007 BL SMEAR W/DIFF WBC COUNT: CPT

## 2018-05-13 RX ADMIN — BACITRACIN ZINC, AND POLYMYXIN B SULFATE: 500; 10000 OINTMENT TOPICAL at 08:46

## 2018-05-13 RX ADMIN — BACITRACIN ZINC, AND POLYMYXIN B SULFATE: 500; 10000 OINTMENT TOPICAL at 20:07

## 2018-05-13 RX ADMIN — BACITRACIN ZINC, AND POLYMYXIN B SULFATE: 500; 10000 OINTMENT TOPICAL at 15:27

## 2018-05-13 RX ADMIN — ENOXAPARIN SODIUM 30 MG: 100 INJECTION SUBCUTANEOUS at 20:06

## 2018-05-13 RX ADMIN — POTASSIUM CHLORIDE 40 MEQ: 1500 TABLET, EXTENDED RELEASE ORAL at 08:46

## 2018-05-13 RX ADMIN — POTASSIUM CHLORIDE 20 MEQ: 1500 TABLET, EXTENDED RELEASE ORAL at 20:06

## 2018-05-13 RX ADMIN — ENOXAPARIN SODIUM 30 MG: 100 INJECTION SUBCUTANEOUS at 08:46

## 2018-05-13 ASSESSMENT — PAIN SCALES - GENERAL
PAINLEVEL_OUTOF10: 0

## 2018-05-13 ASSESSMENT — ENCOUNTER SYMPTOMS
VOMITING: 0
ROS GI COMMENTS: BM 5/12
HEADACHES: 0
FEVER: 0
ABDOMINAL PAIN: 1
SPEECH CHANGE: 0
NAUSEA: 0
SHORTNESS OF BREATH: 0

## 2018-05-13 ASSESSMENT — LIFESTYLE VARIABLES: SUBSTANCE_ABUSE: 0
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
## 2018-05-13 NOTE — CARE PLAN
Problem: Psychosocial Needs:  Goal: Level of anxiety will decrease    Intervention: Identify and develop with patient strategies to cope with anxiety triggers  Pt visiting dad.      Problem: Mobility  Goal: Risk for activity intolerance will decrease    Intervention: Encourage patient to increase activity level in collaboration with Interdisciplinary Team  Pt ambulated 500ft

## 2018-05-14 LAB
MAGNESIUM SERPL-MCNC: 1.9 MG/DL (ref 1.5–2.5)
PHOSPHATE SERPL-MCNC: 4.2 MG/DL (ref 2.5–4.5)

## 2018-05-14 PROCEDURE — 700102 HCHG RX REV CODE 250 W/ 637 OVERRIDE(OP): Performed by: NURSE PRACTITIONER

## 2018-05-14 PROCEDURE — 700111 HCHG RX REV CODE 636 W/ 250 OVERRIDE (IP): Performed by: NURSE PRACTITIONER

## 2018-05-14 PROCEDURE — 97535 SELF CARE MNGMENT TRAINING: CPT

## 2018-05-14 PROCEDURE — 97116 GAIT TRAINING THERAPY: CPT

## 2018-05-14 PROCEDURE — 83735 ASSAY OF MAGNESIUM: CPT

## 2018-05-14 PROCEDURE — 770006 HCHG ROOM/CARE - MED/SURG/GYN SEMI*

## 2018-05-14 PROCEDURE — 36415 COLL VENOUS BLD VENIPUNCTURE: CPT

## 2018-05-14 PROCEDURE — A9270 NON-COVERED ITEM OR SERVICE: HCPCS | Performed by: NURSE PRACTITIONER

## 2018-05-14 PROCEDURE — 84100 ASSAY OF PHOSPHORUS: CPT

## 2018-05-14 PROCEDURE — 97530 THERAPEUTIC ACTIVITIES: CPT

## 2018-05-14 RX ADMIN — ENOXAPARIN SODIUM 30 MG: 100 INJECTION SUBCUTANEOUS at 22:13

## 2018-05-14 RX ADMIN — BACITRACIN ZINC, AND POLYMYXIN B SULFATE: 500; 10000 OINTMENT TOPICAL at 21:00

## 2018-05-14 RX ADMIN — BACITRACIN ZINC, AND POLYMYXIN B SULFATE: 500; 10000 OINTMENT TOPICAL at 15:00

## 2018-05-14 RX ADMIN — BACITRACIN ZINC, AND POLYMYXIN B SULFATE: 500; 10000 OINTMENT TOPICAL at 09:15

## 2018-05-14 RX ADMIN — POTASSIUM CHLORIDE 40 MEQ: 1500 TABLET, EXTENDED RELEASE ORAL at 09:14

## 2018-05-14 RX ADMIN — POTASSIUM CHLORIDE 20 MEQ: 1500 TABLET, EXTENDED RELEASE ORAL at 22:13

## 2018-05-14 RX ADMIN — ENOXAPARIN SODIUM 30 MG: 100 INJECTION SUBCUTANEOUS at 09:14

## 2018-05-14 ASSESSMENT — GAIT ASSESSMENTS
DISTANCE (FEET): 75
GAIT LEVEL OF ASSIST: CONTACT GUARD ASSIST
ASSISTIVE DEVICE: SINGLE POINT CANE
DEVIATION: BRADYKINETIC;SHUFFLED GAIT

## 2018-05-14 ASSESSMENT — COGNITIVE AND FUNCTIONAL STATUS - GENERAL
STANDING UP FROM CHAIR USING ARMS: A LITTLE
MOVING FROM LYING ON BACK TO SITTING ON SIDE OF FLAT BED: A LITTLE
MOBILITY SCORE: 15
CLIMB 3 TO 5 STEPS WITH RAILING: A LITTLE
HELP NEEDED FOR BATHING: A LITTLE
MOVING TO AND FROM BED TO CHAIR: UNABLE
DRESSING REGULAR UPPER BODY CLOTHING: A LITTLE
DRESSING REGULAR LOWER BODY CLOTHING: A LITTLE
SUGGESTED CMS G CODE MODIFIER DAILY ACTIVITY: CJ
TOILETING: A LITTLE
TURNING FROM BACK TO SIDE WHILE IN FLAT BAD: A LOT
DAILY ACTIVITIY SCORE: 20
SUGGESTED CMS G CODE MODIFIER MOBILITY: CK
WALKING IN HOSPITAL ROOM: A LITTLE

## 2018-05-14 ASSESSMENT — ENCOUNTER SYMPTOMS
SPEECH CHANGE: 0
FEVER: 0
VOMITING: 0
SHORTNESS OF BREATH: 0
HEADACHES: 0
ABDOMINAL PAIN: 1
NAUSEA: 0

## 2018-05-14 ASSESSMENT — LIFESTYLE VARIABLES: SUBSTANCE_ABUSE: 0

## 2018-05-14 NOTE — THERAPY
"Occupational Therapy Treatment completed with focus on ADLs, ADL transfers and patient education.  Functional Status:  Pt seen for OT tx. Pt up EOB upon arrival. Pt complete sit > stand w/ SPC and SBA. Pt educated and given different compensatory strategies for LB dressing. Pt accepting to education and gave return demonstration and understanding. Pt pleasant and cooperative. Pt expresses motivation to regain strength, endurance and independence in ADLs and ADL transfers. Pt given education about home safety w/ completing ADLs and ADL transfers. Pt accepting to education and states that she will have assistance at home from family as needed.   Plan of Care: Will benefit from Occupational Therapy 3 times per week  Discharge Recommendations:  Equipment Will Continue to Assess for Equipment Needs.     See \"Rehab Therapy-Acute\" Patient Summary Report for complete documentation.   "

## 2018-05-14 NOTE — CARE PLAN
Problem: Safety  Goal: Will remain free from injury  Outcome: PROGRESSING AS EXPECTED  Pt instructed to call before getting OOB. Pt verbalized understanding and calls appropriately.    Problem: Skin Integrity  Goal: Risk for impaired skin integrity will decrease  Outcome: PROGRESSING AS EXPECTED  Pt demonstrates bed mobility and ambulates with stand-by assist.
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
## 2018-05-14 NOTE — DIETARY
"Nutrition Services: Noted Poor PO on Screen    Ht: 5' 1\"       Wt: 140#     BMI: 26.5 (overweight classification)   Diet: Regular/Vegetarian    Per chart review of ADL's. This pt has been consuming <50% of meals. Saw pt at bedside with her brother. He has been bringing in food for all lunches and dinners as the foods we carry do not follow the cultural and L[employer] day guidelines this pt follows. She reports her intake has been good, her appetite is good, she denied any GI distress and appears well nourished. This pt did not present with any further nutrition-related questions or concerns at this time.     Consult RD as needed, available prn and to rescreen per department policy.   "

## 2018-05-14 NOTE — DISCHARGE PLANNING
Anticipated Discharge Disposition: Home with help.    Action: LSW informed that wound vac to be removed and she will not require a home wound vac at discharge.    LSW met with pt and pt's brother at bedside to complete assessment. Pt would like to go home (grandmother's home) with support from family. Pt's aunt and uncle have flown into town and will be in town until 5/27/2018. She reports that she has been using a cane to help keep her steady. LSW provided pt with [facility] [employer] application for cane and shower chair.     LSW spoke with trauma APRN who reports that pt to discharge home tomorrow.     LSW left VM with [facility] informing them that pt to discharge home.     Barriers to Discharge: None.     Plan: Pt to discharge home to her grandmother's home with support with family.     Care Transition Team Assessment    Information Source  Orientation : Oriented x 4  Information Given By: Patient  Informant's Name:  (Laura Heaton)  Who is responsible for making decisions for patient? : Patient    Readmission Evaluation  Is this a readmission?: No    Elopement Risk  Legal Hold: No  Ambulatory or Self Mobile in Wheelchair: Yes  Disoriented: No  Psychiatric Symptoms: None  History of Wandering: No  Elopement this Admit: No  Vocalizing Wanting to Leave: No  Displays Behaviors, Body Language Wanting to Leave: No-Not at Risk for Elopement  Elopement Risk: Not at Risk for Elopement    Interdisciplinary Discharge Planning  Does Admitting Nurse Feel This Could be a Complex Discharge?: Yes  Lives with - Patient's Self Care Capacity: Parents  Patient or legal guardian wants to designate a caregiver (see row info): No  Support Systems: Family Member(s), Parent  Housing / Facility: 2 Story Apartment / Condo  Do You Take your Prescribed Medications Regularly: Yes  Able to Return to Previous ADL's: Future Time w/Therapy  Mobility Issues: Yes  Prior Services: None  Patient Expects to be Discharged to:: Home/Unknown at this  time  Assistance Needed: Unknown at this Time  Durable Medical Equipment: Not Applicable    Discharge Preparedness  What is your plan after discharge?: Home with help  What are your discharge supports?: Sibling, Grandparent, Other (comment) (Aunt and uncle are in town until the 5/27)  Prior Functional Level: Ambulatory  Difficulity with ADLs: None  Difficulity with IADLs: None    Functional Assesment  Prior Functional Level: Ambulatory    Finances  Financial Barriers to Discharge: No  Source of Income: Employed  Prescription Coverage: Yes    Vision / Hearing Impairment  Vision Impairment : No  Right Eye Vision:  (no deficit)  Left Eye Vision:  (no deficit)  Hearing Impairment : No    Values / Beliefs / Concerns  Values / Beliefs Concerns : No    Advance Directive  Advance Directive?: None  Advance Directive offered?: AD Booklet refused    Domestic Abuse  Have you ever been the victim of abuse or violence?: Not Sure  Physical Abuse or Sexual Abuse: No  Verbal Abuse or Emotional Abuse: No  Possible Abuse Reported to:: Not Applicable    Psychological Assessment  History of Substance Abuse: None  History of Psychiatric Problems: No  Non-compliant with Treatment: No  Newly Diagnosed Illness: No    Discharge Risks or Barriers  Discharge risks or barriers?: No    Anticipated Discharge Information  Anticipated discharge disposition: Home  Discharge Address: [address] [place] [id number]  Discharge Contact Phone Number: [phone]

## 2018-05-14 NOTE — THERAPY
"Physical Therapy Treatment completed.   Bed Mobility:  Supine to Sit: Minimal Assist  Transfers: Sit to Stand: Stand by Assist  Gait: Level Of Assist: Contact Guard Assist with Single Point Cane       Plan of Care: Will benefit from Physical Therapy 4 times per week  Discharge Recommendations: Equipment: Single Point Cane.     See \"Rehab Therapy-Acute\" Patient Summary Report for complete documentation.     Pt continues to progress w/ therapy. Pt more engaged and motivated to participate. Pt was less limited by pain increasing ease of mobility. Pt does require the most assist w/ bed mobility d/t pain in abdomen. Pt able to follow cues for log rolling and states her family will help if needed. Trialed different AD's as pt has been HHA. Pt was the most steady w/ SPC. Pt is steady on her feet but has intermittent LOB during gait where she requires the SPC. Pt able showed improved stability on the stairs using one HR and the SPC. Pt states her brother and aunt and uncle will be able to assist pt. Recommend SPC for increased safety upon DC home.  "

## 2018-05-14 NOTE — DISCHARGE PLANNING
Update from LAURIE Blue that pt is declining inpatient rehab, choice for d/c home. Thank you for the opportunity to assist as this individual prepares for transition to post acute care.

## 2018-05-14 NOTE — WOUND TEAM
Npwt drsg remains intact; no myriam-wnd discoloration; no drainage in the cannister; will plan to d/c the npwt drsg 5/16 if wnd is stable.

## 2018-05-15 LAB
ANISOCYTOSIS BLD QL SMEAR: ABNORMAL
BASOPHILS # BLD AUTO: 0 % (ref 0–1.8)
BASOPHILS # BLD: 0 K/UL (ref 0–0.12)
EOSINOPHIL # BLD AUTO: 0 K/UL (ref 0–0.51)
EOSINOPHIL NFR BLD: 0 % (ref 0–6.9)
ERYTHROCYTE [DISTWIDTH] IN BLOOD BY AUTOMATED COUNT: 47 FL (ref 35.9–50)
GIANT PLATELETS BLD QL SMEAR: NORMAL
HCT VFR BLD AUTO: 31.8 % (ref 37–47)
HGB BLD-MCNC: 9.8 G/DL (ref 12–16)
LYMPHOCYTES # BLD AUTO: 2.7 K/UL (ref 1–4.8)
LYMPHOCYTES NFR BLD: 20 % (ref 22–41)
MACROCYTES BLD QL SMEAR: ABNORMAL
MANUAL DIFF BLD: NORMAL
MCH RBC QN AUTO: 24.5 PG (ref 27–33)
MCHC RBC AUTO-ENTMCNC: 30.8 G/DL (ref 33.6–35)
MCV RBC AUTO: 79.5 FL (ref 81.4–97.8)
MONOCYTES # BLD AUTO: 0.47 K/UL (ref 0–0.85)
MONOCYTES NFR BLD AUTO: 3.5 % (ref 0–13.4)
MORPHOLOGY BLD-IMP: NORMAL
MYELOCYTES NFR BLD MANUAL: 1.7 %
NEUTROPHILS # BLD AUTO: 10.1 K/UL (ref 2–7.15)
NEUTROPHILS NFR BLD: 73.9 % (ref 44–72)
NEUTS BAND NFR BLD MANUAL: 0.9 % (ref 0–10)
NRBC # BLD AUTO: 0 K/UL
NRBC BLD-RTO: 0 /100 WBC
OVALOCYTES BLD QL SMEAR: NORMAL
PLATELET # BLD AUTO: 460 K/UL (ref 164–446)
PLATELET BLD QL SMEAR: NORMAL
PMV BLD AUTO: 9.4 FL (ref 9–12.9)
POIKILOCYTOSIS BLD QL SMEAR: NORMAL
POLYCHROMASIA BLD QL SMEAR: NORMAL
RBC # BLD AUTO: 4 M/UL (ref 4.2–5.4)
RBC BLD AUTO: PRESENT
WBC # BLD AUTO: 13.5 K/UL (ref 4.8–10.8)

## 2018-05-15 PROCEDURE — 700102 HCHG RX REV CODE 250 W/ 637 OVERRIDE(OP): Performed by: NURSE PRACTITIONER

## 2018-05-15 PROCEDURE — 85027 COMPLETE CBC AUTOMATED: CPT

## 2018-05-15 PROCEDURE — 36415 COLL VENOUS BLD VENIPUNCTURE: CPT

## 2018-05-15 PROCEDURE — 700111 HCHG RX REV CODE 636 W/ 250 OVERRIDE (IP): Performed by: NURSE PRACTITIONER

## 2018-05-15 PROCEDURE — 770006 HCHG ROOM/CARE - MED/SURG/GYN SEMI*

## 2018-05-15 PROCEDURE — 93971 EXTREMITY STUDY: CPT

## 2018-05-15 PROCEDURE — A9270 NON-COVERED ITEM OR SERVICE: HCPCS | Performed by: NURSE PRACTITIONER

## 2018-05-15 PROCEDURE — 85007 BL SMEAR W/DIFF WBC COUNT: CPT

## 2018-05-15 RX ADMIN — ENOXAPARIN SODIUM 30 MG: 100 INJECTION SUBCUTANEOUS at 20:01

## 2018-05-15 RX ADMIN — ENOXAPARIN SODIUM 30 MG: 100 INJECTION SUBCUTANEOUS at 09:52

## 2018-05-15 RX ADMIN — POTASSIUM CHLORIDE 40 MEQ: 1500 TABLET, EXTENDED RELEASE ORAL at 09:52

## 2018-05-15 RX ADMIN — BACITRACIN ZINC, AND POLYMYXIN B SULFATE: 500; 10000 OINTMENT TOPICAL at 09:52

## 2018-05-15 RX ADMIN — BACITRACIN ZINC, AND POLYMYXIN B SULFATE: 500; 10000 OINTMENT TOPICAL at 20:02

## 2018-05-15 RX ADMIN — POTASSIUM CHLORIDE 20 MEQ: 1500 TABLET, EXTENDED RELEASE ORAL at 20:02

## 2018-05-15 NOTE — CARE PLAN
Problem: Safety  Goal: Will remain free from falls  Outcome: PROGRESSING AS EXPECTED  Pt instructed to call before getting OOB. Pt verbalized understanding and calls appropriately.    Problem: Skin Integrity  Goal: Risk for impaired skin integrity will decrease  Outcome: PROGRESSING AS EXPECTED  Pt is able to demonstrate bed mobility and ambulates to the restroom SB assist.

## 2018-05-15 NOTE — PROGRESS NOTES
A/ox3, VSS BP running low but has been for some time now and pt. Is asymptomatic. Denies pain, ambulating to bathroom with handheld assistance. Wound VAC to abdomen -CDI. Has been eating meals brought from home by brother, appetite seems to be improving. Discussed plan of care and questions answered.
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
## 2018-05-16 VITALS
HEIGHT: 61 IN | WEIGHT: 140.43 LBS | RESPIRATION RATE: 16 BRPM | OXYGEN SATURATION: 94 % | BODY MASS INDEX: 26.51 KG/M2 | HEART RATE: 90 BPM | TEMPERATURE: 97 F | DIASTOLIC BLOOD PRESSURE: 60 MMHG | SYSTOLIC BLOOD PRESSURE: 96 MMHG

## 2018-05-16 PROCEDURE — 700111 HCHG RX REV CODE 636 W/ 250 OVERRIDE (IP): Performed by: NURSE PRACTITIONER

## 2018-05-16 PROCEDURE — 700102 HCHG RX REV CODE 250 W/ 637 OVERRIDE(OP): Performed by: NURSE PRACTITIONER

## 2018-05-16 PROCEDURE — 97164 PT RE-EVAL EST PLAN CARE: CPT

## 2018-05-16 RX ADMIN — ENOXAPARIN SODIUM 30 MG: 100 INJECTION SUBCUTANEOUS at 09:38

## 2018-05-16 RX ADMIN — BACITRACIN ZINC, AND POLYMYXIN B SULFATE: 500; 10000 OINTMENT TOPICAL at 09:41

## 2018-05-16 ASSESSMENT — ENCOUNTER SYMPTOMS
ABDOMINAL PAIN: 0
CONSTITUTIONAL NEGATIVE: 1
RESPIRATORY NEGATIVE: 1
MUSCULOSKELETAL NEGATIVE: 1
ROS GI COMMENTS: BM 5/15
VOMITING: 0
NAUSEA: 0

## 2018-05-16 NOTE — PROGRESS NOTES
Pt. d/c'd home via personal vehicle with family. Refused hospital escort. D/c instructions reviewed with pt./family and questions answered. No scripts ordered.
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
## 2018-05-16 NOTE — DISCHARGE INSTRUCTIONS
Discharge Instructions    Discharged to home by car with relative. Discharged via wheelchair, hospital escort: Yes.  Special equipment needed: Not Applicable    Be sure to schedule a follow-up appointment with your primary care doctor or any specialists as instructed.     Discharge Plan:     - Follow up with Dr. Gabriel in 1 weeks time   - Follow up with primary care provider within one weeks time   - Resume regular diet   - May take over the counter acetaminophen or ibuprofen as needed for pain   - Continue daily over the counter stool softener while on narcotics   - No operation of machinery or motorized vehicles while under the influence of narcotics   - No alcohol use while under the influence of narcotics   - No swimming, hot tubs, baths or wound submersion until cleared by outpatient provider. May shower   - No contact sports, strenuous activities, or heavy lifting until cleared by outpatient provider   - If respiratory decompensation, change in condition or worsening conditon, or signs or symptoms of infection occur seek medical attention          Influenza Vaccine Indication: Patient Refuses    I understand that a diet low in cholesterol, fat, and sodium is recommended for good health. Unless I have been given specific instructions below for another diet, I accept this instruction as my diet prescription.   Other diet: n/a    Special Instructions: None    · Is patient discharged on Warfarin / Coumadin?   No     Depression / Suicide Risk    As you are discharged from this [facility] facility, it is important to learn how to keep safe from harming yourself.    Recognize the warning signs:  · Abrupt changes in personality, positive or negative- including increase in energy   · Giving away possessions  · Change in eating patterns- significant weight changes-  positive or negative  · Change in sleeping patterns- unable to sleep or sleeping all the time   · Unwillingness or inability to  communicate  · Depression  · Unusual sadness, discouragement and loneliness  · Talk of wanting to die  · Neglect of personal appearance   · Rebelliousness- reckless behavior  · Withdrawal from people/activities they love  · Confusion- inability to concentrate     If you or a loved one observes any of these behaviors or has concerns about self-harm, here's what you can do:  · Talk about it- your feelings and reasons for harming yourself  · Remove any means that you might use to hurt yourself (examples: pills, rope, extension cords, firearm)  · Get professional help from the community (Mental Health, Substance Abuse, psychological counseling)  · Do not be alone:Call your Safe Contact- someone whom you trust who will be there for you.  · Call your local CRISIS HOTLINE 973-6104 or 641-154-7005  · Call your local Children's Mobile Crisis Response Team [place] (901) 910-9698 or www.[employer]  · Call the toll free National Suicide Prevention Hotlines   · National Suicide Prevention Lifeline 214-579-VTEZ (9259)  · National Hope Line Network 800-SUICIDE (959-1934)

## 2018-05-17 NOTE — WOUND TEAM
"[facility]  Inpatient Services  Wound and Skin Care Progress Note    Admission Date:   5/5/18  HPI, PMH, SH: Reviewed  Unit where seen by Wound Team:   T4    WOUND CONSULT RELATED TO:  Follow-up on abdominal wnd    SUBJECTIVE:  Pleasant/agreeable    Self Report / Pain Level:  kristy well    OBJECTIVE:   Previously placed npwt remained intact    WOUND TYPE, LOCATION, CHARACTERISTICS (Pressure ulcers: location, stage, POA or date identified)    Location and type of wound: Small dehisced area at distal end of abdominal incision        Periwound:      intact  Drainage:      Scant sanguinous  Tissue Type and %:     100% red  Wound Edges:     attached  Odor:       none  Exposed structure(s):   none  S&S of Infection:    none      Measurements: (cm)   Taken 5/9/18  Length:      0.3  Width:       1.0  Depth:      0.2      INTERVENTIONS BY WOUND TEAM:   npwt drsg and staples removed -- area cleansed with wnd cleanser -- benzoin applied around the incision -- 1/2\" steri-strips used to stabilize the incision -- island drsg to cover the incision    Interdisciplinary consultation: nsg; pt; aprn    EVALUATION:   Incision remained well approximated with a small/shallow gap distally > incision stabilized with steri-strips; per nsg pt is to follow-up with outpt wnd care and md    Factors affecting wound healing: none    Goals: intact/approximated incision    NURSING PLAN OF CARE ORDERS (x):    Dressing changes: See Dressing Maintenance orders: x  Skin care: See Skin Care orders: x  Rectal tube care: See Rectal Tube Care orders:   Other orders:    WOUND TEAM PLAN OF CARE (x):   NPWT change 3 x week:        Dressing changes by wound team:       Follow up as needed:     x  Other (explain):     Anticipated discharge plans (x):  SNF:           Home Care:           Outpatient Wound Center:       x     Self Care:          Other:  "
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
## 2018-07-17 ENCOUNTER — HOSPITAL ENCOUNTER (OUTPATIENT)
Dept: RADIOLOGY | Facility: MEDICAL CENTER | Age: 18
End: 2018-07-17
Attending: SURGERY
Payer: COMMERCIAL

## 2018-07-17 DIAGNOSIS — R10.9 STOMACH ACHE: ICD-10-CM

## 2018-07-17 PROCEDURE — 76700 US EXAM ABDOM COMPLETE: CPT

## (undated) DEVICE — SUTURE 1 PDS II PLUS TP-1 - (12PK/BX)

## (undated) DEVICE — GLOVE BIOGEL PI INDICATOR SZ 7.0 SURGICAL PF LF - (50/BX 4BX/CA)

## (undated) DEVICE — PROTECTOR ULNA NERVE - (36PR/CA)

## (undated) DEVICE — LACTATED RINGERS INJ 1000 ML - (14EA/CA 60CA/PF)

## (undated) DEVICE — CANISTER SUCTION 3000ML MECHANICAL FILTER AUTO SHUTOFF MEDI-VAC NONSTERILE LF DISP  (40EA/CA)

## (undated) DEVICE — PAD LAP STERILE 18 X 18 - (5/PK 40PK/CA)

## (undated) DEVICE — HEAD HOLDER JUNIOR/ADULT

## (undated) DEVICE — DETERGENT RENUZYME PLUS 10 OZ PACKET (50/BX)

## (undated) DEVICE — SUTURE GENERAL

## (undated) DEVICE — SUTURE 3-0 VICRYL PLUS SH - 8X 18 INCH (12/BX)

## (undated) DEVICE — MASK ANESTHESIA ADULT  - (100/CA)

## (undated) DEVICE — ELECTRODE DUAL RETURN W/ CORD - (50/PK)

## (undated) DEVICE — STAPLE 55MM LINEAR BLUE 3.8MM (12EA/BX)

## (undated) DEVICE — BAG, SPONGE COUNT 50600

## (undated) DEVICE — ELECTRODE 850 FOAM ADHESIVE - HYDROGEL RADIOTRNSPRNT (50/PK)

## (undated) DEVICE — TUBE E-T HI-LO CUFF 6.5MM (10EA/BX)

## (undated) DEVICE — CLIP MED LG INTNL HRZN TI ESCP - (20/BX)

## (undated) DEVICE — BLADE SURGICAL CLIPPER - (50EA/CA)

## (undated) DEVICE — CLIP LG INTNL HRZN TI ESCP LGT - (20/BX)

## (undated) DEVICE — GLOVE BIOGEL PI INDICATOR SZ 6.5 SURGICAL PF LF - (50/BX 4BX/CA)

## (undated) DEVICE — KIT DRESSING WOUND VAC ABDOMEN W/TRAC PAD (5EA/CA)

## (undated) DEVICE — SET SUCT.W/SLEEVE VIA-GUARD - (10/BX10BX/CA)

## (undated) DEVICE — STAPLER SKIN DISP - (6/BX 10BX/CA) VISISTAT

## (undated) DEVICE — GLOVE BIOGEL SZ 6.5 SURGICAL PF LTX (50PR/BX 4BX/CA)

## (undated) DEVICE — GLOVE BIOGEL SZ 7 SURGICAL PF LTX - (50PR/BX 4BX/CA)

## (undated) DEVICE — GVL 3 STAT DISPOSABLE - (10/BX)

## (undated) DEVICE — SET EXTENSION WITH 2 PORTS (48EA/CA) ***PART #2C8610 IS A SUBSTITUTE*****

## (undated) DEVICE — SODIUM CHL IRRIGATION 0.9% 1000ML (12EA/CA)

## (undated) DEVICE — GLOVE SZ 6.5 BIOGEL PI MICRO - PF LF (50PR/BX)

## (undated) DEVICE — TRAY SURESTEP FOLEY TEMP SENSING 16FR (10EA/CA) ORDER  #18764 FOR TEMP FOLEY ONLY

## (undated) DEVICE — SUTURE 2-0 VICRYL PLUS CT-1 36 (36PK/BX)"

## (undated) DEVICE — GOWN SURGEONS X-LARGE - DISP. (30/CA)

## (undated) DEVICE — CLIP MED INTNL HRZN TI ESCP - (25/BX)

## (undated) DEVICE — RETAINER 9 1/8INX5 7/8IN MED - (10/BX)

## (undated) DEVICE — TOWELS CLOTH SURGICAL - (4/PK 20PK/CA)

## (undated) DEVICE — SENSOR SPO2 NEO LNCS ADHESIVE (20/BX) SEE USER NOTES

## (undated) DEVICE — DRESSING SURGICAL NUKNIT 6X9 (10EA/BX)

## (undated) DEVICE — SET LEADWIRE 5 LEAD BEDSIDE DISPOSABLE ECG (1SET OF 5/EA)

## (undated) DEVICE — TUBING CLEARLINK DUO-VENT - C-FLO (48EA/CA)

## (undated) DEVICE — GLOVE BIOGEL SZ 7.5 SURGICAL PF LTX - (50PR/BX 4BX/CA)

## (undated) DEVICE — GLOVE BIOGEL SZ 8.5 SURGICAL PF LTX - (50PR/BX 4BX/CA)

## (undated) DEVICE — SUCTION INSTRUMENT YANKAUER BULBOUS TIP W/O VENT (50EA/CA)

## (undated) DEVICE — EVICEL 5ML - (1/BX)REFRIGERATE UPON RECEIPT

## (undated) DEVICE — GLOVE BIOGEL SZ 8 SURGICAL PF LTX - (50PR/BX 4BX/CA)

## (undated) DEVICE — TUBE CONNECT SUCTION CLEAR 120 X 1/4" (50EA/CA)"

## (undated) DEVICE — BLANKET WARMING LOWER BODY - (10/CA) INACTIVE USE #8585

## (undated) DEVICE — BOVIE  BLADE 6 EXTENDED - (50/PK)

## (undated) DEVICE — GOWN WARMING STANDARD FLEX - (30/CA)

## (undated) DEVICE — DRAPE LAPAROTOMY T SHEET - (12EA/CA)

## (undated) DEVICE — GLOVE BIOGEL INDICATOR SZ 8 SURGICAL PF LTX - (50/BX 4BX/CA)

## (undated) DEVICE — SPONGE GAUZESTER 4 X 4 4PLY - (128PK/CA)

## (undated) DEVICE — SUTURE 2-0 COATED VICRYL PLUS - 12 X 18 INCH (12/BX)

## (undated) DEVICE — KIT ROOM DECONTAMINATION

## (undated) DEVICE — SODIUM CHL. INJ. 0.9% 500ML (24EA/CA 50CA/PF)

## (undated) DEVICE — CHLORAPREP 26 ML APPLICATOR - ORANGE TINT(25/CA)

## (undated) DEVICE — GLOVE BIOGEL INDICATOR SZ 8.5 SURGICAL PF LTX - (50/BX 4BX/CA)

## (undated) DEVICE — NEPTUNE 4 PORT MANIFOLD - (20/PK)

## (undated) DEVICE — PACK MAJOR BASIN - (2EA/CA)

## (undated) DEVICE — KIT ANESTHESIA W/CIRCUIT & 3/LT BAG W/FILTER (20EA/CA)